# Patient Record
Sex: FEMALE | Race: BLACK OR AFRICAN AMERICAN | Employment: OTHER | URBAN - METROPOLITAN AREA
[De-identification: names, ages, dates, MRNs, and addresses within clinical notes are randomized per-mention and may not be internally consistent; named-entity substitution may affect disease eponyms.]

---

## 2017-05-23 ENCOUNTER — IP HISTORICAL/CONVERTED ENCOUNTER (OUTPATIENT)
Dept: OTHER | Age: 82
End: 2017-05-23

## 2017-11-24 ENCOUNTER — IP HISTORICAL/CONVERTED ENCOUNTER (OUTPATIENT)
Dept: OTHER | Age: 82
End: 2017-11-24

## 2021-09-15 ENCOUNTER — APPOINTMENT (OUTPATIENT)
Dept: CT IMAGING | Age: 86
DRG: 690 | End: 2021-09-15
Attending: EMERGENCY MEDICINE
Payer: MEDICARE

## 2021-09-15 ENCOUNTER — HOSPITAL ENCOUNTER (INPATIENT)
Age: 86
LOS: 2 days | Discharge: HOME | DRG: 690 | End: 2021-09-17
Attending: EMERGENCY MEDICINE | Admitting: INTERNAL MEDICINE
Payer: MEDICARE

## 2021-09-15 ENCOUNTER — APPOINTMENT (OUTPATIENT)
Dept: GENERAL RADIOLOGY | Age: 86
DRG: 690 | End: 2021-09-15
Attending: EMERGENCY MEDICINE
Payer: MEDICARE

## 2021-09-15 DIAGNOSIS — R40.0 SOMNOLENCE: ICD-10-CM

## 2021-09-15 DIAGNOSIS — R55 SYNCOPE AND COLLAPSE: ICD-10-CM

## 2021-09-15 DIAGNOSIS — N30.00 ACUTE CYSTITIS WITHOUT HEMATURIA: Primary | ICD-10-CM

## 2021-09-15 PROBLEM — R41.82 AMS (ALTERED MENTAL STATUS): Status: ACTIVE | Noted: 2021-09-15

## 2021-09-15 LAB
ALBUMIN SERPL-MCNC: 2.5 G/DL (ref 3.5–5)
ALBUMIN/GLOB SERPL: 0.6 {RATIO} (ref 1.1–2.2)
ALP SERPL-CCNC: 105 U/L (ref 45–117)
ALT SERPL-CCNC: 13 U/L (ref 12–78)
ANION GAP SERPL CALC-SCNC: 5 MMOL/L (ref 5–15)
APPEARANCE UR: ABNORMAL
APTT PPP: 26.9 SEC (ref 21.2–34.1)
AST SERPL W P-5'-P-CCNC: 15 U/L (ref 15–37)
ATRIAL RATE: 76 BPM
BACTERIA URNS QL MICRO: ABNORMAL /HPF
BASOPHILS # BLD: 0.1 K/UL (ref 0–0.1)
BASOPHILS NFR BLD: 1 % (ref 0–1)
BILIRUB SERPL-MCNC: 0.5 MG/DL (ref 0.2–1)
BILIRUB UR QL: NEGATIVE
BUN SERPL-MCNC: 6 MG/DL (ref 6–20)
BUN/CREAT SERPL: 9 (ref 12–20)
CA-I BLD-MCNC: 8.6 MG/DL (ref 8.5–10.1)
CALCULATED P AXIS, ECG09: 64 DEGREES
CALCULATED T AXIS, ECG11: 19 DEGREES
CHLORIDE SERPL-SCNC: 109 MMOL/L (ref 97–108)
CK SERPL-CCNC: 24 NG/ML (ref 26–192)
CO2 SERPL-SCNC: 29 MMOL/L (ref 21–32)
COLOR UR: ABNORMAL
CREAT SERPL-MCNC: 0.68 MG/DL (ref 0.55–1.02)
DIAGNOSIS, 93000: NORMAL
DIFFERENTIAL METHOD BLD: ABNORMAL
EOSINOPHIL # BLD: 0.1 K/UL (ref 0–0.4)
EOSINOPHIL NFR BLD: 1 % (ref 0–7)
ERYTHROCYTE [DISTWIDTH] IN BLOOD BY AUTOMATED COUNT: 14.6 % (ref 11.5–14.5)
GLOBULIN SER CALC-MCNC: 4.4 G/DL (ref 2–4)
GLUCOSE BLD STRIP.AUTO-MCNC: 175 MG/DL (ref 65–117)
GLUCOSE SERPL-MCNC: 191 MG/DL (ref 65–100)
GLUCOSE UR STRIP.AUTO-MCNC: 50 MG/DL
HCT VFR BLD AUTO: 40.1 % (ref 35–47)
HGB BLD-MCNC: 12.7 G/DL (ref 11.5–16)
HGB UR QL STRIP: ABNORMAL
HYALINE CASTS URNS QL MICRO: ABNORMAL /LPF (ref 0–5)
IMM GRANULOCYTES # BLD AUTO: 0 K/UL (ref 0–0.04)
IMM GRANULOCYTES NFR BLD AUTO: 0 % (ref 0–0.5)
INR PPP: 1.1 (ref 0.9–1.1)
KETONES UR QL STRIP.AUTO: NEGATIVE MG/DL
LEUKOCYTE ESTERASE UR QL STRIP.AUTO: ABNORMAL
LYMPHOCYTES # BLD: 5.1 K/UL (ref 0.8–3.5)
LYMPHOCYTES NFR BLD: 40 % (ref 12–49)
MAGNESIUM SERPL-MCNC: 2.1 MG/DL (ref 1.6–2.4)
MCH RBC QN AUTO: 27.1 PG (ref 26–34)
MCHC RBC AUTO-ENTMCNC: 31.7 G/DL (ref 30–36.5)
MCV RBC AUTO: 85.5 FL (ref 80–99)
MONOCYTES # BLD: 0.6 K/UL (ref 0–1)
MONOCYTES NFR BLD: 4 % (ref 5–13)
NEUTS SEG # BLD: 7 K/UL (ref 1.8–8)
NEUTS SEG NFR BLD: 54 % (ref 32–75)
NITRITE UR QL STRIP.AUTO: NEGATIVE
NRBC # BLD: 0 K/UL (ref 0–0.01)
NRBC BLD-RTO: 0 PER 100 WBC
P-R INTERVAL, ECG05: 210 MS
PERFORMED BY, TECHID: ABNORMAL
PH UR STRIP: 7 [PH] (ref 5–8)
PLATELET # BLD AUTO: 267 K/UL (ref 150–400)
PMV BLD AUTO: 12.6 FL (ref 8.9–12.9)
POTASSIUM SERPL-SCNC: 2.8 MMOL/L (ref 3.5–5.1)
PROT SERPL-MCNC: 6.9 G/DL (ref 6.4–8.2)
PROT UR STRIP-MCNC: 100 MG/DL
PROTHROMBIN TIME: 13.9 SEC (ref 11.9–14.7)
Q-T INTERVAL, ECG07: 424 MS
QRS DURATION, ECG06: 82 MS
QTC CALCULATION (BEZET), ECG08: 477 MS
RBC # BLD AUTO: 4.69 M/UL (ref 3.8–5.2)
RBC #/AREA URNS HPF: ABNORMAL /HPF (ref 0–5)
SODIUM SERPL-SCNC: 143 MMOL/L (ref 136–145)
SP GR UR REFRACTOMETRY: 1.01 (ref 1–1.03)
THERAPEUTIC RANGE,PTTT: NORMAL SEC
TROPONIN I SERPL-MCNC: <0.05 NG/ML
TSH SERPL DL<=0.05 MIU/L-ACNC: 0.47 UIU/ML (ref 0.36–3.74)
UA: UC IF INDICATED,UAUC: ABNORMAL
UROBILINOGEN UR QL STRIP.AUTO: 2 EU/DL (ref 0.1–1)
VENTRICULAR RATE, ECG03: 76 BPM
WBC # BLD AUTO: 12.9 K/UL (ref 3.6–11)
WBC URNS QL MICRO: >100 /HPF (ref 0–4)

## 2021-09-15 PROCEDURE — 87077 CULTURE AEROBIC IDENTIFY: CPT

## 2021-09-15 PROCEDURE — 74011250636 HC RX REV CODE- 250/636: Performed by: EMERGENCY MEDICINE

## 2021-09-15 PROCEDURE — 87186 SC STD MICRODIL/AGAR DIL: CPT

## 2021-09-15 PROCEDURE — 71045 X-RAY EXAM CHEST 1 VIEW: CPT

## 2021-09-15 PROCEDURE — 80053 COMPREHEN METABOLIC PANEL: CPT

## 2021-09-15 PROCEDURE — 65270000029 HC RM PRIVATE

## 2021-09-15 PROCEDURE — 87086 URINE CULTURE/COLONY COUNT: CPT

## 2021-09-15 PROCEDURE — 93005 ELECTROCARDIOGRAM TRACING: CPT

## 2021-09-15 PROCEDURE — 81001 URINALYSIS AUTO W/SCOPE: CPT

## 2021-09-15 PROCEDURE — 84484 ASSAY OF TROPONIN QUANT: CPT

## 2021-09-15 PROCEDURE — 70450 CT HEAD/BRAIN W/O DYE: CPT

## 2021-09-15 PROCEDURE — 96375 TX/PRO/DX INJ NEW DRUG ADDON: CPT

## 2021-09-15 PROCEDURE — 82550 ASSAY OF CK (CPK): CPT

## 2021-09-15 PROCEDURE — 99285 EMERGENCY DEPT VISIT HI MDM: CPT

## 2021-09-15 PROCEDURE — 85025 COMPLETE CBC W/AUTO DIFF WBC: CPT

## 2021-09-15 PROCEDURE — 74011000250 HC RX REV CODE- 250: Performed by: EMERGENCY MEDICINE

## 2021-09-15 PROCEDURE — 84443 ASSAY THYROID STIM HORMONE: CPT

## 2021-09-15 PROCEDURE — 74011250637 HC RX REV CODE- 250/637: Performed by: INTERNAL MEDICINE

## 2021-09-15 PROCEDURE — 84439 ASSAY OF FREE THYROXINE: CPT

## 2021-09-15 PROCEDURE — 85730 THROMBOPLASTIN TIME PARTIAL: CPT

## 2021-09-15 PROCEDURE — 85610 PROTHROMBIN TIME: CPT

## 2021-09-15 PROCEDURE — 83735 ASSAY OF MAGNESIUM: CPT

## 2021-09-15 PROCEDURE — 96374 THER/PROPH/DIAG INJ IV PUSH: CPT

## 2021-09-15 PROCEDURE — 82962 GLUCOSE BLOOD TEST: CPT

## 2021-09-15 RX ORDER — HYDRALAZINE HYDROCHLORIDE 20 MG/ML
10 INJECTION INTRAMUSCULAR; INTRAVENOUS ONCE
Status: COMPLETED | OUTPATIENT
Start: 2021-09-15 | End: 2021-09-15

## 2021-09-15 RX ORDER — ONDANSETRON 2 MG/ML
4 INJECTION INTRAMUSCULAR; INTRAVENOUS
Status: COMPLETED | OUTPATIENT
Start: 2021-09-15 | End: 2021-09-15

## 2021-09-15 RX ORDER — LOSARTAN POTASSIUM 50 MG/1
50 TABLET ORAL DAILY
COMMUNITY
End: 2022-03-01

## 2021-09-15 RX ORDER — RIVASTIGMINE 4.6 MG/24H
1 PATCH, EXTENDED RELEASE TRANSDERMAL DAILY
Status: DISCONTINUED | OUTPATIENT
Start: 2021-09-16 | End: 2021-09-17 | Stop reason: HOSPADM

## 2021-09-15 RX ORDER — POTASSIUM CHLORIDE 7.45 MG/ML
10 INJECTION INTRAVENOUS
Status: COMPLETED | OUTPATIENT
Start: 2021-09-15 | End: 2021-09-15

## 2021-09-15 RX ORDER — LOSARTAN POTASSIUM 50 MG/1
50 TABLET ORAL DAILY
Status: DISCONTINUED | OUTPATIENT
Start: 2021-09-16 | End: 2021-09-17 | Stop reason: HOSPADM

## 2021-09-15 RX ORDER — SODIUM CHLORIDE AND POTASSIUM CHLORIDE .9; .15 G/100ML; G/100ML
SOLUTION INTRAVENOUS CONTINUOUS
Status: DISCONTINUED | OUTPATIENT
Start: 2021-09-15 | End: 2021-09-17 | Stop reason: HOSPADM

## 2021-09-15 RX ADMIN — POTASSIUM CHLORIDE 10 MEQ: 7.46 INJECTION, SOLUTION INTRAVENOUS at 18:00

## 2021-09-15 RX ADMIN — POTASSIUM CHLORIDE 10 MEQ: 7.46 INJECTION, SOLUTION INTRAVENOUS at 15:30

## 2021-09-15 RX ADMIN — SODIUM CHLORIDE 500 ML: 9 INJECTION, SOLUTION INTRAVENOUS at 12:38

## 2021-09-15 RX ADMIN — APIXABAN 5 MG: 2.5 TABLET, FILM COATED ORAL at 23:48

## 2021-09-15 RX ADMIN — HYDRALAZINE HYDROCHLORIDE 10 MG: 20 INJECTION INTRAMUSCULAR; INTRAVENOUS at 14:18

## 2021-09-15 RX ADMIN — POTASSIUM CHLORIDE 10 MEQ: 7.46 INJECTION, SOLUTION INTRAVENOUS at 17:06

## 2021-09-15 RX ADMIN — CEFTRIAXONE 1 G: 1 INJECTION, POWDER, FOR SOLUTION INTRAMUSCULAR; INTRAVENOUS at 14:05

## 2021-09-15 RX ADMIN — ONDANSETRON 4 MG: 2 INJECTION INTRAMUSCULAR; INTRAVENOUS at 16:00

## 2021-09-15 RX ADMIN — POTASSIUM CHLORIDE AND SODIUM CHLORIDE: 900; 150 INJECTION, SOLUTION INTRAVENOUS at 19:36

## 2021-09-15 RX ADMIN — POTASSIUM CHLORIDE 10 MEQ: 7.46 INJECTION, SOLUTION INTRAVENOUS at 14:05

## 2021-09-15 NOTE — PROGRESS NOTES
Reason for Admission:  AMS                     RUR Score:  7%                   Plan for utilizing home health:   W/C bound/no home health @ this time. PCP: First and Last name:  Priyanka [address] [place] [address]      Name of Practice:    Are you a current patient: Yes/No: Yes   Approximate date of last visit: f tele call in April. Can you participate in a virtual visit with your PCP: No                    Current Advanced Directive/Advance Care Plan: No Order      Healthcare Decision Maker:   Primary HCDM is daughter ( Samantha Sawyer @ [phone]. Transition of Care Plan:   D/C plan is home with daughter ( Samantha Sawyer). Pt will need transport home upon discharge - has stairs.

## 2021-09-15 NOTE — ED PROVIDER NOTES
EMERGENCY DEPARTMENT HISTORY AND PHYSICAL EXAM      Date: 9/15/2021  Patient Name: Vignesh Martin      History of Presenting Illness     Chief Complaint   Patient presents with    Syncope    Altered mental status       History Provided By: Patient and EMS    HPI: Vignesh Martin, 80 y.o. female with a past medical history significant unknown presents to the ED with cc of syncope. Per EMS, patient was on the bedside commode. She had a witnessed syncopal event with her daughter. She did not hit her head. Daughter did report the patient is on Eliquis. She was helped to the ground by her daughter. She has been acting lethargic all morning. No other known infectious symptoms. Patient has no seizure history in the did not report any shaking, bowel or bladder incontinence. She is limited secondary to patient's clinical condition as she is not able to participate in the history. No family at bedside. There are no other complaints, changes, or physical findings at this time. PCP: Amrit, MD Briseida    Current Facility-Administered Medications   Medication Dose Route Frequency Provider Last Rate Last Admin    potassium chloride 10 mEq in 100 ml IVPB  10 mEq IntraVENous Clary KAHN  mL/hr at 09/15/21 1530 10 mEq at 09/15/21 1530    0.9% sodium chloride with KCl 20 mEq/L infusion   IntraVENous CONTINUOUS Ritu Kaye MD        ondansetron [facility]) injection 4 mg  4 mg IntraVENous NOW Carolyn Baird MD         Current Outpatient Medications   Medication Sig Dispense Refill    losartan (COZAAR) 50 mg tablet Take 50 mg by mouth daily.  apixaban (Eliquis) 5 mg tablet Take 5 mg by mouth two (2) times a day.          Past History     Past Medical History:  Unknown    Past Surgical History:  Unknown    Family History:  Unknown    Social History:  Social History     Tobacco Use    Smoking status: Not on file   Substance Use Topics    Alcohol use: Not on file    Drug use: Not on file Allergies:  No Known Allergies      Review of Systems     Review of Systems   Unable to perform ROS: Mental status change       Physical Exam     Physical Exam  Vitals and nursing note reviewed. Constitutional:       General: She is not in acute distress. Appearance: Normal appearance. She is obese. She is not diaphoretic. HENT:      Head: Normocephalic and atraumatic. Nose: Nose normal.      Mouth/Throat:      Mouth: Mucous membranes are moist.   Eyes:      Conjunctiva/sclera: Conjunctivae normal.   Cardiovascular:      Rate and Rhythm: Normal rate. Pulses: Normal pulses. Pulmonary:      Effort: Pulmonary effort is normal. No respiratory distress. Musculoskeletal:         General: No swelling or deformity. Normal range of motion. Skin:     General: Skin is warm and dry. Findings: No rash. Neurological:      General: No focal deficit present. Mental Status: She is lethargic. Cranial Nerves: No facial asymmetry. Comments: Patient arouses to loud voice, or touch. Following some commands. Protecting airway. Tensely moves all 4 extremities. Psychiatric:      Comments: Unable to assess. Lab and Diagnostic Study Results     Labs -     Recent Results (from the past 12 hour(s))   CBC WITH AUTOMATED DIFF    Collection Time: 09/15/21 12:15 PM   Result Value Ref Range    WBC 12.9 (H) 3.6 - 11.0 K/uL    RBC 4.69 3.80 - 5.20 M/uL    HGB 12.7 11.5 - 16.0 g/dL    HCT 40.1 35.0 - 47.0 %    MCV 85.5 80.0 - 99.0 FL    MCH 27.1 26.0 - 34.0 PG    MCHC 31.7 30.0 - 36.5 g/dL    RDW 14.6 (H) 11.5 - 14.5 %    PLATELET 258 232 - 042 K/uL    MPV 12.6 8.9 - 12.9 FL    NRBC 0.0 0.0  WBC    ABSOLUTE NRBC 0.00 0.00 - 0.01 K/uL    NEUTROPHILS 54 32 - 75 %    LYMPHOCYTES 40 12 - 49 %    MONOCYTES 4 (L) 5 - 13 %    EOSINOPHILS 1 0 - 7 %    BASOPHILS 1 0 - 1 %    IMMATURE GRANULOCYTES 0 0 - 0.5 %    ABS. NEUTROPHILS 7.0 1.8 - 8.0 K/UL    ABS.  LYMPHOCYTES 5.1 (H) 0.8 - 3.5 K/UL ABS. MONOCYTES 0.6 0.0 - 1.0 K/UL    ABS. EOSINOPHILS 0.1 0.0 - 0.4 K/UL    ABS. BASOPHILS 0.1 0.0 - 0.1 K/UL    ABS. IMM. GRANS. 0.0 0.00 - 0.04 K/UL    DF AUTOMATED     METABOLIC PANEL, COMPREHENSIVE    Collection Time: 09/15/21 12:15 PM   Result Value Ref Range    Sodium 143 136 - 145 mmol/L    Potassium 2.8 (L) 3.5 - 5.1 mmol/L    Chloride 109 (H) 97 - 108 mmol/L    CO2 29 21 - 32 mmol/L    Anion gap 5 5 - 15 mmol/L    Glucose 191 (H) 65 - 100 mg/dL    BUN 6 6 - 20 mg/dL    Creatinine 0.68 0.55 - 1.02 mg/dL    BUN/Creatinine ratio 9 (L) 12 - 20      GFR est AA >60 >60 ml/min/1.73m2    GFR est non-AA >60 >60 ml/min/1.73m2    Calcium 8.6 8.5 - 10.1 mg/dL    Bilirubin, total 0.5 0.2 - 1.0 mg/dL    AST (SGOT) 15 15 - 37 U/L    ALT (SGPT) 13 12 - 78 U/L    Alk.  phosphatase 105 45 - 117 U/L    Protein, total 6.9 6.4 - 8.2 g/dL    Albumin 2.5 (L) 3.5 - 5.0 g/dL    Globulin 4.4 (H) 2.0 - 4.0 g/dL    A-G Ratio 0.6 (L) 1.1 - 2.2     CK W/ REFLX CKMB    Collection Time: 09/15/21 12:15 PM   Result Value Ref Range    CK 24.0 (L) 26 - 192 ng/mL   TROPONIN I    Collection Time: 09/15/21 12:15 PM   Result Value Ref Range    Troponin-I, Qt. <0.05 <0.05 ng/mL   URINALYSIS W/ REFLEX CULTURE    Collection Time: 09/15/21 12:15 PM    Specimen: Urine   Result Value Ref Range    Color Rupali      Appearance Turbid (A) Clear      Specific gravity 1.015 1.003 - 1.030      pH (UA) 7.0 5.0 - 8.0      Protein 100 (A) Negative mg/dL    Glucose 50 (A) Negative mg/dL    Ketone Negative Negative mg/dL    Bilirubin Negative Negative      Blood Small (A) Negative      Urobilinogen 2.0 (H) 0.1 - 1.0 EU/dL    Nitrites Negative Negative      Leukocyte Esterase Large (A) Negative      WBC >100 (H) 0 - 4 /hpf    RBC 20-50 0 - 5 /hpf    Bacteria 3+ (A) Negative /hpf    UA:UC IF INDICATED Urine Culture Ordered (A) Culture not indicated by UA result      Hyaline cast 5-10 0 - 5 /lpf   GLUCOSE, POC    Collection Time: 09/15/21 12:36 PM   Result Value Ref Range    Glucose (POC) 175 (H) 65 - 117 mg/dL    Performed by Helene Davidson    MAGNESIUM    Collection Time: 09/15/21  2:00 PM   Result Value Ref Range    Magnesium 2.1 1.6 - 2.4 mg/dL       Radiologic Studies -   [unfilled]  CT Results  (Last 48 hours)               09/15/21 1319  CT HEAD WO CONT Final result    Impression:  Periventricular white matter gliosis, evidence for nonacute end   vessel occlusive change. Central cerebral arteriosclerosis. No intracranial hemorrhage. Narrative:  CT head. Axial images are reviewed along with reformatted sagittal/coronal images. Dose reduction: All CT scans at this facility are performed using dose reduction   optimization techniques as appropriate to a performed exam including the   following-   automated exposure control, adjustments of mA and/or Kv according to patient   size, or use of iterative reconstructive technique. Bone windows demonstrate normal aeration imaged sinuses mastoid air cells. Review of intracranial content reveals ventricular and sulcal prominence related   to cerebral atrophy. Periventricular white matter gliosis, evidence to suggest   nonacute end vessel occlusive change. Bilateral basal ganglia calcification. Central cerebral arteriosclerosis. Falx-based calcification. No intracranial   hemorrhage. CXR Results  (Last 48 hours)               09/15/21 1300  XR CHEST PORT Final result    Narrative:  Chest single view. Lungs clear; no interstitial or alveolar pulmonary edema. Cardiac and   mediastinal structures are magnified on this AP view. Thoracic aorta   atherosclerosis. No pneumothorax or sizable pleural effusion. Medical Decision Making and ED Course   - I am the first and primary provider for this patient AND AM THE PRIMARY PROVIDER OF RECORD.     - I reviewed the vital signs, available nursing notes, past medical history, past surgical history, family history and social history. - Initial assessment performed. The patients presenting problems have been discussed, and the staff are in agreement with the care plan formulated and outlined with them. I have encouraged them to ask questions as they arise throughout their visit. Vital Signs-Reviewed the patient's vital signs. Patient Vitals for the past 12 hrs:   Temp Pulse Resp BP SpO2   09/15/21 1407  76 17 (!) 193/93 97 %   09/15/21 1318  72 18 (!) 176/86 100 %   09/15/21 1211 97.9 °F (36.6 °C) 75 19 (!) 153/81 100 %       EKG interpretation: (Preliminary): Performed at 1412, and read at 1420  Rhythm: normal sinus rhythm; and regular . Rate (approx.): 76 Axis: normal; HI interval: prolonged; QRS interval: normal ; ST/T wave: normal; Other findings: left ventricular hypertrophy. Records Reviewed: Nursing Notes        ED Course:       ED Course as of Sep 15 1541   Wed Sep 15, 2021   158 80year-old female unknown past medical history presents via EMS for syncope. Patient was sitting on the commode when she had a syncopal episode. Witnessed by her daughter who helped to the floor. Patient then became responsive only to loud voice and pain just how she is on arrival here. She did not hit her head but is anticoagulated on Eliquis. Differential diagnosis includes orthostatic hypotension versus vasovagal syncope versus seizure versus ICH versus metabolic encephalopathy. Getting labs including CBC, CMP, troponin, UA and CT of her head and chest x-ray. Blood glucose is 175. Patient is protecting her airway at this time. Disposition as per clinical course.    [LW]   2697 wet read reports that patient's CT is unremarkable, formal read is forthcoming. Has UTI which is likely source of her symptoms. Given ceftriaxone. She has hypertension however did not take her blood pressure medications this morning so given IV hydralazine.   Patient admitted to Dr. Salcedo Pritesh.    [LW]      ED Course User Index  [LW] Arian King MD             Consultations:       Consultations: -  Hospitalist Consultant: Dr. Muñiz Leader: We have asked for emergent assistance with regard to this patient. We have discussed the patients HPI, ROS, PE and results this far. They will come and evaluate the patient for admission. Disposition     Disposition: Admitted to Floor Medical Floor the case was discussed with the admitting physician Antonino Sloan. Admitted      Diagnosis     Clinical Impression:   1. Acute cystitis without hematuria    2. Syncope and collapse    3. Somnolence        Attestations:    Kortney Graham MD    Please note that this dictation was completed with [employer], the computer voice recognition software. Quite often unanticipated grammatical, syntax, homophones, and other interpretive errors are inadvertently transcribed by the computer software. Please disregard these errors. Please excuse any errors that have escaped final proofreading. Thank you.

## 2021-09-15 NOTE — ED NOTES
TRANSFER - OUT REPORT:    Verbal report given to LAMONTE Luong on Lonnie Austin  being transferred to [phone]. Report consisted of patients Situation, Background, Assessment and   Recommendations(SBAR). Information from the following report(s) SBAR, ED Summary, [facility] and Recent Results was reviewed with the receiving nurse. Lines:   Peripheral IV 09/15/21 Right;Mid Forearm (Active)   Site Assessment Clean, dry, & intact 09/15/21 1228   Phlebitis Assessment 0 09/15/21 1228   Infiltration Assessment 0 09/15/21 1228   Dressing Status Clean, dry, & intact 09/15/21 1228   Dressing Type Tape;Transparent 09/15/21 1228   Hub Color/Line Status Pink;Flushed;Patent 09/15/21 1228       Peripheral IV 09/15/21 Left; Lower Forearm (Active)   Site Assessment Clean, dry, & intact 09/15/21 1229   Phlebitis Assessment 0 09/15/21 1229   Infiltration Assessment 0 09/15/21 1229   Dressing Status Clean, dry, & intact 09/15/21 1229   Dressing Type Tape;Transparent 09/15/21 1229   Hub Color/Line Status Pink;Flushed;Patent 09/15/21 1229        Opportunity for questions and clarification was provided.

## 2021-09-15 NOTE — ED TRIAGE NOTES
Patient had a witnessed syncopal episode at home while sitting on the [facility] per EMS. Patient was not using restroom at the time. Patient, since syncopal episode, has been altered and aroused to painful stimuli.

## 2021-09-15 NOTE — PROGRESS NOTES
9/15/21. PCP is Dr. Rusty Zuñiga. D/C Plan is home with daughter ( Blanca Husbands @ [phone]. Pt will need transportation home - has stairs. Pt is w/c bound.

## 2021-09-15 NOTE — Clinical Note
Status[de-identified] INPATIENT [101]   Type of Bed: Telemetry [19]   Cardiac Monitoring Required?: Yes   Inpatient Hospitalization Certified Necessary for the Following Reasons: 3.  Patient receiving treatment that can only be provided in an inpatient setting (further clarification in H&P documentation)   Admitting Diagnosis: AMS (altered mental status) [2957203]   Admitting Physician: Larissa Cruz [[id number]]   Attending Physician: Reche Bound [[id number]]   Estimated Length of Stay: 2 Midnights   Discharge Plan[de-identified] Home with Office Follow-up

## 2021-09-15 NOTE — ED NOTES
12:42 PM    Patient's clothing placed in a patient belonging bag. Patient's social security card placed in a white envelope with \"social security card\" written on the outside and placed in belonging bag with clothing. 2:15 PM    Dr. Freddrick Cooks notified of blood pressure with orders received.

## 2021-09-15 NOTE — ED NOTES
Care assumed and bedside SBAR report endorsed on Sameer Armenta. Pt cardiac monitoring continued , spO2 Monitoring continued, IV reassed, call bell within reach, side rails up x2, resting comfortable but easily arousable, no signs of acute distress. , bed in lowest position, MAR reviewed, Labs reviewed, will continue to monitor

## 2021-09-16 PROBLEM — E44.1 MILD PROTEIN-CALORIE MALNUTRITION (HCC): Status: ACTIVE | Noted: 2021-09-16

## 2021-09-16 LAB
ANION GAP SERPL CALC-SCNC: 6 MMOL/L (ref 5–15)
ATRIAL RATE: 156 BPM
BUN SERPL-MCNC: 5 MG/DL (ref 6–20)
BUN/CREAT SERPL: 10 (ref 12–20)
CA-I BLD-MCNC: 8.6 MG/DL (ref 8.5–10.1)
CALCULATED R AXIS, ECG10: 31 DEGREES
CALCULATED T AXIS, ECG11: -82 DEGREES
CHLORIDE SERPL-SCNC: 108 MMOL/L (ref 97–108)
CO2 SERPL-SCNC: 29 MMOL/L (ref 21–32)
CREAT SERPL-MCNC: 0.5 MG/DL (ref 0.55–1.02)
DIAGNOSIS, 93000: NORMAL
GLUCOSE SERPL-MCNC: 111 MG/DL (ref 65–100)
POTASSIUM SERPL-SCNC: 3.9 MMOL/L (ref 3.5–5.1)
Q-T INTERVAL, ECG07: 272 MS
QRS DURATION, ECG06: 74 MS
QTC CALCULATION (BEZET), ECG08: 401 MS
SODIUM SERPL-SCNC: 143 MMOL/L (ref 136–145)
T4 FREE SERPL-MCNC: 1.3 NG/DL (ref 0.8–1.5)
VENTRICULAR RATE, ECG03: 131 BPM

## 2021-09-16 PROCEDURE — 97161 PT EVAL LOW COMPLEX 20 MIN: CPT

## 2021-09-16 PROCEDURE — 97530 THERAPEUTIC ACTIVITIES: CPT

## 2021-09-16 PROCEDURE — 74011250637 HC RX REV CODE- 250/637: Performed by: INTERNAL MEDICINE

## 2021-09-16 PROCEDURE — 93005 ELECTROCARDIOGRAM TRACING: CPT

## 2021-09-16 PROCEDURE — 74011000250 HC RX REV CODE- 250: Performed by: INTERNAL MEDICINE

## 2021-09-16 PROCEDURE — 80048 BASIC METABOLIC PNL TOTAL CA: CPT

## 2021-09-16 PROCEDURE — 36415 COLL VENOUS BLD VENIPUNCTURE: CPT

## 2021-09-16 PROCEDURE — 74011250636 HC RX REV CODE- 250/636: Performed by: INTERNAL MEDICINE

## 2021-09-16 PROCEDURE — 65270000029 HC RM PRIVATE

## 2021-09-16 PROCEDURE — 74011250636 HC RX REV CODE- 250/636: Performed by: EMERGENCY MEDICINE

## 2021-09-16 RX ORDER — DILTIAZEM HYDROCHLORIDE 120 MG/1
180 CAPSULE, COATED, EXTENDED RELEASE ORAL DAILY
Status: DISCONTINUED | OUTPATIENT
Start: 2021-09-16 | End: 2021-09-17 | Stop reason: HOSPADM

## 2021-09-16 RX ORDER — POTASSIUM CHLORIDE 1.5 G/1.77G
20 POWDER, FOR SOLUTION ORAL 2 TIMES DAILY WITH MEALS
Status: COMPLETED | OUTPATIENT
Start: 2021-09-16 | End: 2021-09-17

## 2021-09-16 RX ORDER — DILTIAZEM HYDROCHLORIDE 30 MG/1
30 TABLET, FILM COATED ORAL 3 TIMES DAILY
Status: DISCONTINUED | OUTPATIENT
Start: 2021-09-16 | End: 2021-09-16

## 2021-09-16 RX ADMIN — DILTIAZEM HYDROCHLORIDE 30 MG: 30 TABLET, FILM COATED ORAL at 09:52

## 2021-09-16 RX ADMIN — POTASSIUM CHLORIDE 20 MEQ: 1.5 FOR SOLUTION ORAL at 16:28

## 2021-09-16 RX ADMIN — POTASSIUM CHLORIDE 20 MEQ: 1.5 FOR SOLUTION ORAL at 08:00

## 2021-09-16 RX ADMIN — APIXABAN 5 MG: 2.5 TABLET, FILM COATED ORAL at 22:22

## 2021-09-16 RX ADMIN — POTASSIUM CHLORIDE 20 MEQ: 1.5 FOR SOLUTION ORAL at 01:43

## 2021-09-16 RX ADMIN — CEFTRIAXONE 1 G: 1 INJECTION, POWDER, FOR SOLUTION INTRAMUSCULAR; INTRAVENOUS at 14:07

## 2021-09-16 RX ADMIN — DILTIAZEM HYDROCHLORIDE 30 MG: 30 TABLET, FILM COATED ORAL at 01:42

## 2021-09-16 RX ADMIN — DILTIAZEM HYDROCHLORIDE 240 MG: 120 CAPSULE, COATED, EXTENDED RELEASE ORAL at 16:28

## 2021-09-16 RX ADMIN — CEFTRIAXONE 1 G: 1 INJECTION, POWDER, FOR SOLUTION INTRAMUSCULAR; INTRAVENOUS at 22:22

## 2021-09-16 RX ADMIN — APIXABAN 5 MG: 2.5 TABLET, FILM COATED ORAL at 09:52

## 2021-09-16 RX ADMIN — POTASSIUM CHLORIDE AND SODIUM CHLORIDE: 900; 150 INJECTION, SOLUTION INTRAVENOUS at 22:29

## 2021-09-16 RX ADMIN — LOSARTAN POTASSIUM 50 MG: 50 TABLET, FILM COATED ORAL at 09:56

## 2021-09-16 NOTE — PROGRESS NOTES
CM reviewed chart. Patient is currently being treated with IV antibiotics and IV fluids. Patient is wheelchair bound at home and plan is to return home with daughter. CM will continue to follow for any possible discharge needs.

## 2021-09-16 NOTE — PROGRESS NOTES
Admission skin assessment done with Ann Lambert RN. Some moisture noted underneath breasts and abdominal folds, otherwise skin is intact.

## 2021-09-16 NOTE — PROGRESS NOTES
PROGRESS NOTE      Subjective: Poor appetite. According to daughter who is at bedside patient is reasonably back to baseline. She still talks gibberish. Barely follows command.   Episode of A. fib with RVR yesterday     Visit Vitals  BP (!) 149/76   Pulse (!) 114   Temp 98.7 °F (37.1 °C)   Resp 16   Ht 5' 6\" (1.676 m)   Wt 89.3 kg (196 lb 13.9 oz)   SpO2 99%   BMI 31.78 kg/m²       Current Facility-Administered Medications:     potassium chloride (KLOR-CON) packet for solution 20 mEq, 20 mEq, Oral, BID WITH MEALS, Abdiaziz Villegas MD, 20 mEq at 09/16/21 0800    dilTIAZem IR (CARDIZEM) tablet 30 mg, 30 mg, Oral, TID, Koki SEE MD, 30 mg at 09/16/21 8136    0.9% sodium chloride with KCl 20 mEq/L infusion, , IntraVENous, CONTINUOUS, Kev Obrien MD, Last Rate: 75 mL/hr at 09/15/21 1936, New Bag at 09/15/21 1936    apixaban (ELIQUIS) tablet 5 mg, 5 mg, Oral, BID, Koki SEE MD, 5 mg at 09/16/21 0952    losartan (COZAAR) tablet 50 mg, 50 mg, Oral, DAILY, Abdiaziz Villegas MD, 50 mg at 09/16/21 0956    cefTRIAXone (ROCEPHIN) 1 g in sterile water (preservative free) 10 mL IV syringe, 1 g, IntraVENous, Q24H, Abdiaziz Morales MD    rivastigmine (EXELON) 4.6 mg/24 hour patch 1 Patch, 1 Patch, TransDERmal, DAILY, Koki SEE MD, 1 Patch at 09/16/21 1254  Recent Results (from the past 24 hour(s))   MAGNESIUM    Collection Time: 09/15/21  2:00 PM   Result Value Ref Range    Magnesium 2.1 1.6 - 2.4 mg/dL   PROTHROMBIN TIME + INR    Collection Time: 09/15/21  9:25 PM   Result Value Ref Range    Prothrombin time 13.9 11.9 - 14.7 sec    INR 1.1 0.9 - 1.1     PTT    Collection Time: 09/15/21  9:25 PM   Result Value Ref Range    aPTT 26.9 21.2 - 34.1 sec    aPTT, therapeutic range   sec   TSH 3RD GENERATION    Collection Time: 09/15/21 10:00 PM   Result Value Ref Range    TSH 0.47 0.36 - 3.74 uIU/mL   T4, FREE    Collection Time: 09/15/21 10:00 PM   Result Value Ref Range    T4, Free 1.3 0.8 - 1.5 ng/dL   EKG, 12 LEAD, INITIAL    Collection Time: 09/16/21  1:21 AM   Result Value Ref Range    Ventricular Rate 131 BPM    Atrial Rate 156 BPM    QRS Duration 74 ms    Q-T Interval 272 ms    QTC Calculation (Bezet) 401 ms    Calculated R Axis 31 degrees    Calculated T Axis -82 degrees    Diagnosis       Atrial fibrillation with rapid ventricular response with premature   ventricular or aberrantly conducted complexes  Nonspecific ST and T wave abnormality  Abnormal ECG  No previous ECGs available  Confirmed by Magdiel Grande ([id number]) on 9/16/2021 72:45:04 PM     METABOLIC PANEL, BASIC    Collection Time: 09/16/21  8:05 AM   Result Value Ref Range    Sodium 143 136 - 145 mmol/L    Potassium 3.9 3.5 - 5.1 mmol/L    Chloride 108 97 - 108 mmol/L    CO2 29 21 - 32 mmol/L    Anion gap 6 5 - 15 mmol/L    Glucose 111 (H) 65 - 100 mg/dL    BUN 5 (L) 6 - 20 mg/dL    Creatinine 0.50 (L) 0.55 - 1.02 mg/dL    BUN/Creatinine ratio 10 (L) 12 - 20      GFR est AA >60 >60 ml/min/1.73m2    GFR est non-AA >60 >60 ml/min/1.73m2    Calcium 8.6 8.5 - 10.1 mg/dL     CT HEAD WO CONT   Final Result   Periventricular white matter gliosis, evidence for nonacute end   vessel occlusive change. Central cerebral arteriosclerosis. No intracranial hemorrhage. XR CHEST PORT   Final Result                HEENT: Normocephalic atraumatic. Neck: No distended neck vein  Lungs: Clear anteriorly  Heart: Irregularly irregular obese positive bowel sounds  Abdomen:   Lower Extremities: No cyanosis or edema  CNS: Awake alert talks Elizabeth Men will he follows command. Psych:      Assessment: UTI  A. fib with RVR  Dementia   hypertension  Hypokalemia  Weakness          Plan: Discharge planning. Continue nutritional support. Physical therapy. Discussed at length with daughter. Will attempt to control the heart rate better.

## 2021-09-16 NOTE — CONSULTS
Comprehensive Nutrition Assessment    Type and Reason for Visit: Consult (Poor PO)    Nutrition Recommendations/Plan:   Continue Easy to Chew diet    Rec'd SLP eval    Add ensure HP TID  Add beneprotein BID    Monitor BG levels, may want to consider obtaining A1C and starting insulin    Document % intakes and BM in I/O's    Nutrition Assessment:  Admitted for weakness and worsening baseline mental status. Episode of A. fib with RVR on 9/15. RD consulted for poor PO, talked with pt daughter bedside. Pt consumed 0% of B, but had <25% of L consisting of mashed potatoes, ice cream, and applesauce. Agreeable to ensure HP, and beneprotein as MD wants pt to consume more protein. RD to monitor ONS acceptance. Labs: Glu , BUN 5, Cr 0.50. Meds: rocephin, KCl. Malnutrition Assessment:  Malnutrition Status:  Mild malnutrition    Context:  Acute illness     Findings of the 6 clinical characteristics of malnutrition:   Energy Intake:  7 - 50% or less of est energy requirements for 5 or more days  Weight Loss:  No significant weight loss     Body Fat Loss:  No significant body fat loss,     Muscle Mass Loss:  No significant muscle mass loss,    Fluid Accumulation:  No significant fluid accumulation,      Nutrition Related Findings:  NFPE without acute findings. Had some c/s issues yesterday, but family has now brought in dentures, SLP dalila likely needed. No BM since admit. No N/V/D/C. No edema. Wounds:    None       Current Nutrition Therapies:  ADULT DIET Easy to Chew  ADULT ORAL NUTRITION SUPPLEMENT Breakfast, Lunch, Dinner; Low Calorie/High Protein    Anthropometric Measures:  · Height:  5' 6\" (167.6 cm)  · Current Body Wt:  89.3 kg (196 lb 13.9 oz)     · Ideal Body Wt:  130 lbs:  151.4 %   · BMI Category:  Obese class 1 (BMI 30.0-34. 9)       Nutrition Diagnosis:   · Inadequate oral intake related to cognitive or neurological impairment (AMS) as evidenced by intake 26-50%    Nutrition Interventions:   Food and/or Nutrient Delivery: Continue current diet, Start oral nutrition supplement  Nutrition Education and Counseling: Education not indicated  Coordination of Nutrition Care: Continue to monitor while inpatient, Speech therapy    Goals:  Pt to meet >75% of EEN within 5 days. BG <180. Nutrition Monitoring and Evaluation:   Behavioral-Environmental Outcomes: None identified  Food/Nutrient Intake Outcomes: Supplement intake, Food and nutrient intake  Physical Signs/Symptoms Outcomes: Chewing or swallowing, Meal time behavior, Biochemical data, Weight    Discharge Planning:     Too soon to determine     Electronically signed by Shelbie Akhtar RD on 9/16/2021 at 3:55 PM    Contact: [phone]

## 2021-09-16 NOTE — CONSULTS
Nutrition Note    Consult received for poor appetite. RD to add supplements and f/u for complete assessment. Consider SLP dalila d/t noted confusion, difficulty following commands.      Electronically signed by Nelson Angulo on 9/16/2021 at 2:19 PM    Contact: [phone] or via [employer]

## 2021-09-16 NOTE — PROGRESS NOTES
Notified by telemetry patient in a-fib with HR in the 130's. Spoke with daughter, however she is unsure if patient has history of a-fib. Dr. Anjel Post called to make aware and order for EKG given. Dr. Anjel Post notified of EKG results and he stated that he will put in some orders for patient.

## 2021-09-16 NOTE — PROGRESS NOTES
PHYSICAL THERAPY EVALUATION  Patient: Antoine Vega (89 y.o. female)  Date: 9/16/2021  Primary Diagnosis: AMS (altered mental status) [R41.82]        Precautions: falls    ASSESSMENT  This is a 81 y/o female who  came to  [facility]  with c/o  weakness and worsening baseline mental status , admitted on  9/15/21 for AMS  . Pt has PMH of syncope ,DVT,Low vision/macular degeneration,Degenerative disc disease,Advanced dementia ,Hypertension ,Obesity , back surgery and cataract repair . Pt received  in semi-supine , daughter present in the room . Pt is alert & disoriented x 4  ,pt  unable to provide any info about PLOF. Per daughter , pt lives with daughter and niece in a 2 story house with  4 steps to enter , HR on bilateral sides , 7 steps to go to the second floor , HR on L side going up  . Daughter reports , pt's bedroom is upstairs , but once pt is up on the second floor , she doesn't come down and stay on 2nd floor ,was dependent with ADL's and  for functional transfers/mobility , w/c bound prior to admission . Based on the objective data described below, the patient presents with inability to follow commands at this time . Per daughter  ,  pt follow commands sometimes. Pt requires total assist for rolling from side to  side and is  resistive  . Pt appears to be at her functional baseline and is not appropriate for physical therapy sec to inability to follow commands . Daughter provided education  about care of advanced dementia pt . No acute PT needs at this time , will d/c from skilled PT services . Recommend d/c to home with 24 x 7 SUP and prior level of care  when medically appropriate . Current Level of Function Impacting Discharge (mobility/balance): Pt is dependent for transfers/mobility    Functional Outcome Measure: The patient scored 6 on the AM PAC basic mobility outcome measure which is indicative of high complexity.       Other factors to consider for discharge: PLOF, family support        PLAN :  Recommendation for discharge: (in order for the patient to meet his/her long term goals)  Home with 24 x7 sup and prior level of care     This discharge recommendation:  Has been made in collaboration with the attending provider and/or case management    IF patient discharges home will need the following DME: hospital bed         SUBJECTIVE:   Patient stated nothing when asked few questions  OBJECTIVE DATA SUMMARY:   HISTORY:    No past medical history on file. No past surgical history on file. Personal factors and/or comorbidities impacting plan of care:     Home Situation  Home Environment: Private residence  # Steps to Enter: 2  Rails to Enter: Yes  Hand Rails : Bilateral  Wheelchair Ramp: No  One/Two Story Residence: Two story  # of Interior Steps: 7  Interior Rails: Left  Living Alone: No  Support Systems: Child(blank) (lives with daughter)  Current DME Used/Available at Home: Commode, bedside, Grab bars, Shower chair, Walker, rolling, Wheelchair    PLOF: Pt is dependent for ADLS/IADLS, dependent with transfers , w/c bound prior to admission. EXAMINATION/PRESENTATION/DECISION MAKING:   Critical Behavior:  Neurologic State: Alert, Confused  Orientation Level: Disoriented X4  Cognition: Decreased attention/concentration, Decreased command following, Memory loss, Impaired decision making  Safety/Judgement: Decreased insight into deficits, Decreased awareness of need for safety, Decreased awareness of need for assistance, Decreased awareness of environment  Hearing:     Skin:  intact where exposed    Range Of Motion:  AROM: Generally decreased, functional    Strength:    Strength:  (unable to perform MMT sec o pt with impaired cognition )  joelle:      Functional Mobility:  Bed Mobility:  Rolling: Total assistance    Functional Measure:    [address] [facility] [address] [address] AM-PAC 6 Clicks         Basic Mobility Inpatient Short Form  How much difficulty does the patient currently have. .. Unable A Lot A Little None   1. Turning over in bed (including adjusting bedclothes, sheets and blankets)? [x] 1   [] 2   [] 3   [] 4   2. Sitting down on and standing up from a chair with arms ( e.g., wheelchair, bedside commode, etc.)   [x] 1   [] 2   [] 3   [] 4   3. Moving from lying on back to sitting on the side of the bed? [x] 1   [] 2   [] 3   [] 4          How much help from another person does the patient currently need. .. Total A Lot A Little None   4. Moving to and from a bed to a chair (including a wheelchair)? [x] 1   [] 2   [] 3   [] 4   5. Need to walk in hospital room? [x] 1   [] 2   [] 3   [] 4   6. Climbing 3-5 steps with a railing? [x] 1   [] 2   [] 3   [] 4   © , Trustees of [address] [place] [address], under license to [employer]. All rights reserved     Score:  Initial: 6 Most Recent: X (Date: 21-- )   Interpretation of Tool:  Represents activities that are increasingly more difficult (i.e. Bed mobility, Transfers, Gait). Score 24 23 22-20 19-15 14-10 9-7 6   Modifier CH CI CJ CK CL CM CN          Physical Therapy Evaluation Charge Determination   History Examination Presentation Decision-Making   MEDIUM  Complexity : 1-2 comorbidities / personal factors will impact the outcome/ POC  LOW Complexity : 1-2 Standardized tests and measures addressing body structure, function, activity limitation and / or participation in recreation  LOW Complexity : Stable, uncomplicated  Other Functional Measure [facility] 6 high complexity      Based on the above components, the patient evaluation is determined to be of the following complexity level: LOW     Pain Ratin/10    Activity Tolerance:   Fair  Please refer to the flowsheet for vital signs taken during this treatment.     After treatment patient left in no apparent distress:   Supine in bed, Call bell within reach, Bed / chair alarm activated, Caregiver / family present, and Side rails x 3    COMMUNICATION/EDUCATION:   The patients plan of care was discussed with: Registered nurse. Fall prevention education was provided and the patient/caregiver indicated understanding.     Thank you for this referral.  Ian Richardson   Time Calculation: 30 mins

## 2021-09-16 NOTE — H&P
History and Physical (Inpatient)    Patient:    Adriano Mason   80 y.o. Chief Complaint:   Chief Complaint   Patient presents with    Syncope    Altered mental status         History of Present Illness: This is an 30-year-old black female with history of DVT, dementia apparently syncope in the past arthritis who presents with weakness and worsening baseline mental status. All history obtainable from the daughter Ms. Ginny Petit. She tells me while patient was on the commode she kind of slumped off and he helped her to the ground. There was no tonic-clonic activity she did not hit her head. The brought to the hospital to have her evaluated. Because she is on Eliquis patient has had a CAT scan of the brain. At baseline daughter says she is pretty advanced in her mental status. ROS: Unobtainable from the patient      History:  History of syncope  History of DVT  Low vision/macular degeneration  Degenerative disc disease  Advanced dementia  Hypertension  Obesity    Past surgical history  Status post hysterectomy  Status post back surgery  Status post cataract repair    Social history  She is  with 5 children  She does not drink or smoke   She walks with a walker      No family history on file.     Allergies:  No Known Allergies    Current Medications:    Current Facility-Administered Medications:     0.9% sodium chloride with KCl 20 mEq/L infusion, , IntraVENous, CONTINUOUS, Shannon Mazariegos MD, Last Rate: 75 mL/hr at 09/15/21 1936, New Bag at 09/15/21 1936    apixaban (ELIQUIS) tablet 5 mg, 5 mg, Oral, BID, Jessi Dugan MD    [START ON 9/16/2021] losartan (COZAAR) tablet 50 mg, 50 mg, Oral, DAILY, [place]betts Shows, Abdiaziz SEE MD    cefTRIAXone (ROCEPHIN) 1 g in sterile water (preservative free) 10 mL IV syringe, 1 g, IntraVENous, Q24H, Jessi Dugan MD       Physical Exam:  Visit Vitals  BP (!) 142/91   Pulse (!) 128   Temp 97.9 °F (36.6 °C)   Resp 11   Ht 5' 6\" (1.676 m)   Wt 90.7 kg (200 lb)   SpO2 100%   BMI 32.28 kg/m²     Elderly black female obese comfortably no respiratory distress    HEENT normocephalic atraumatic oral mucosa no teeth dry mucous membrane  Neck without any distended neck vein  Lungs are clear bilaterally no wheeze or crackles  Heart S1-S2 regular  Abdomen soft obese nontender nondistended positive bowel sounds  Lower extremities without any cyanosis or edema  CNS alert , does not follow command easily irritable  Psych not so cooperative    Impression this is an 44-year-old black female with advanced dementia, history of DVT, degenerative disc disease arthritis who presents with weakness found to have hypokalemia and UTI admitted for further care.     Findings/Diagnosis: Urinary tract infection  Possible transient loss of consciousness  History of DVT  Hypertension  Hypokalemia  Weakness probably secondary to hypokalemia  Advanced dementia    Laboratory:      Recent Results (from the past 24 hour(s))   EKG, 12 LEAD, INITIAL    Collection Time: 09/15/21 12:09 PM   Result Value Ref Range    Ventricular Rate 76 BPM    Atrial Rate 76 BPM    P-R Interval 210 ms    QRS Duration 82 ms    Q-T Interval 424 ms    QTC Calculation (Bezet) 477 ms    Calculated P Axis 64 degrees    Calculated T Axis 19 degrees    Diagnosis       Sinus rhythm with 1st degree A-V block with occasional Premature ventricular   complexes  Minimal voltage criteria for LVH, may be normal variant  Borderline ECG  No previous ECGs available  Confirmed by Polly Chavarria ([id number]) on 9/15/2021 4:12:33 PM     CBC WITH AUTOMATED DIFF    Collection Time: 09/15/21 12:15 PM   Result Value Ref Range    WBC 12.9 (H) 3.6 - 11.0 K/uL    RBC 4.69 3.80 - 5.20 M/uL    HGB 12.7 11.5 - 16.0 g/dL    HCT 40.1 35.0 - 47.0 %    MCV 85.5 80.0 - 99.0 FL    MCH 27.1 26.0 - 34.0 PG    MCHC 31.7 30.0 - 36.5 g/dL    RDW 14.6 (H) 11.5 - 14.5 %    PLATELET 558 776 - 725 K/uL    MPV 12.6 8.9 - 12.9 FL    NRBC 0.0 0.0  WBC    ABSOLUTE NRBC 0.00 0.00 - 0.01 K/uL    NEUTROPHILS 54 32 - 75 %    LYMPHOCYTES 40 12 - 49 %    MONOCYTES 4 (L) 5 - 13 %    EOSINOPHILS 1 0 - 7 %    BASOPHILS 1 0 - 1 %    IMMATURE GRANULOCYTES 0 0 - 0.5 %    ABS. NEUTROPHILS 7.0 1.8 - 8.0 K/UL    ABS. LYMPHOCYTES 5.1 (H) 0.8 - 3.5 K/UL    ABS. MONOCYTES 0.6 0.0 - 1.0 K/UL    ABS. EOSINOPHILS 0.1 0.0 - 0.4 K/UL    ABS. BASOPHILS 0.1 0.0 - 0.1 K/UL    ABS. IMM. GRANS. 0.0 0.00 - 0.04 K/UL    DF AUTOMATED     METABOLIC PANEL, COMPREHENSIVE    Collection Time: 09/15/21 12:15 PM   Result Value Ref Range    Sodium 143 136 - 145 mmol/L    Potassium 2.8 (L) 3.5 - 5.1 mmol/L    Chloride 109 (H) 97 - 108 mmol/L    CO2 29 21 - 32 mmol/L    Anion gap 5 5 - 15 mmol/L    Glucose 191 (H) 65 - 100 mg/dL    BUN 6 6 - 20 mg/dL    Creatinine 0.68 0.55 - 1.02 mg/dL    BUN/Creatinine ratio 9 (L) 12 - 20      GFR est AA >60 >60 ml/min/1.73m2    GFR est non-AA >60 >60 ml/min/1.73m2    Calcium 8.6 8.5 - 10.1 mg/dL    Bilirubin, total 0.5 0.2 - 1.0 mg/dL    AST (SGOT) 15 15 - 37 U/L    ALT (SGPT) 13 12 - 78 U/L    Alk.  phosphatase 105 45 - 117 U/L    Protein, total 6.9 6.4 - 8.2 g/dL    Albumin 2.5 (L) 3.5 - 5.0 g/dL    Globulin 4.4 (H) 2.0 - 4.0 g/dL    A-G Ratio 0.6 (L) 1.1 - 2.2     CK W/ REFLX CKMB    Collection Time: 09/15/21 12:15 PM   Result Value Ref Range    CK 24.0 (L) 26 - 192 ng/mL   TROPONIN I    Collection Time: 09/15/21 12:15 PM   Result Value Ref Range    Troponin-I, Qt. <0.05 <0.05 ng/mL   URINALYSIS W/ REFLEX CULTURE    Collection Time: 09/15/21 12:15 PM    Specimen: Urine   Result Value Ref Range    Color Rupali      Appearance Turbid (A) Clear      Specific gravity 1.015 1.003 - 1.030      pH (UA) 7.0 5.0 - 8.0      Protein 100 (A) Negative mg/dL    Glucose 50 (A) Negative mg/dL    Ketone Negative Negative mg/dL    Bilirubin Negative Negative      Blood Small (A) Negative      Urobilinogen 2.0 (H) 0.1 - 1.0 EU/dL    Nitrites Negative Negative      Leukocyte Esterase Large (A) Negative      WBC >100 (H) 0 - 4 /hpf    RBC 20-50 0 - 5 /hpf    Bacteria 3+ (A) Negative /hpf    UA:UC IF INDICATED Urine Culture Ordered (A) Culture not indicated by UA result      Hyaline cast 5-10 0 - 5 /lpf   GLUCOSE, POC    Collection Time: 09/15/21 12:36 PM   Result Value Ref Range    Glucose (POC) 175 (H) 65 - 117 mg/dL    Performed by Ahmet Moran    MAGNESIUM    Collection Time: 09/15/21  2:00 PM   Result Value Ref Range    Magnesium 2.1 1.6 - 2.4 mg/dL       CT HEAD WO CONT   Final Result   Periventricular white matter gliosis, evidence for nonacute end   vessel occlusive change. Central cerebral arteriosclerosis. No intracranial hemorrhage. XR CHEST PORT   Final Result          Plan of Care/Planned Procedure: Admit to the hospital.  IV fluid IV antibiotics. Correct hypokalemia. Fall precautions. Physical therapy. Treat UTI. Discussed with daughter. DVT prophylaxis.

## 2021-09-17 VITALS
HEIGHT: 66 IN | RESPIRATION RATE: 20 BRPM | HEART RATE: 92 BPM | BODY MASS INDEX: 31.64 KG/M2 | OXYGEN SATURATION: 98 % | TEMPERATURE: 98.3 F | SYSTOLIC BLOOD PRESSURE: 117 MMHG | WEIGHT: 196.87 LBS | DIASTOLIC BLOOD PRESSURE: 76 MMHG

## 2021-09-17 PROCEDURE — 74011250637 HC RX REV CODE- 250/637: Performed by: INTERNAL MEDICINE

## 2021-09-17 RX ORDER — SULFAMETHOXAZOLE AND TRIMETHOPRIM 800; 160 MG/1; MG/1
1 TABLET ORAL 2 TIMES DAILY
Qty: 14 TABLET | Refills: 0 | Status: SHIPPED | OUTPATIENT
Start: 2021-09-17 | End: 2021-09-24

## 2021-09-17 RX ORDER — RIVASTIGMINE 4.6 MG/24H
1 PATCH, EXTENDED RELEASE TRANSDERMAL DAILY
Qty: 30 PATCH | Refills: 0 | Status: SHIPPED | OUTPATIENT
Start: 2021-09-18 | End: 2021-10-18

## 2021-09-17 RX ORDER — DILTIAZEM HYDROCHLORIDE 180 MG/1
180 CAPSULE, COATED, EXTENDED RELEASE ORAL DAILY
Qty: 30 CAPSULE | Refills: 0 | Status: SHIPPED | OUTPATIENT
Start: 2021-09-18 | End: 2021-10-18

## 2021-09-17 RX ADMIN — POTASSIUM CHLORIDE 20 MEQ: 1.5 FOR SOLUTION ORAL at 09:53

## 2021-09-17 RX ADMIN — DILTIAZEM HYDROCHLORIDE 240 MG: 120 CAPSULE, COATED, EXTENDED RELEASE ORAL at 09:48

## 2021-09-17 RX ADMIN — APIXABAN 5 MG: 2.5 TABLET, FILM COATED ORAL at 09:48

## 2021-09-17 RX ADMIN — LOSARTAN POTASSIUM 50 MG: 50 TABLET, FILM COATED ORAL at 09:47

## 2021-09-17 NOTE — DISCHARGE SUMMARY
Discharge Summary     Florinda Da Silva Date:   9/15/2021 12:05 PM  Discharge Date:   9/17/2021  Discharge Condition:   Improved, stable  Discharge Diagnosis  Problem List Items Addressed This Visit          Other    AMS (altered mental status)          Other Visit Diagnoses       Acute cystitis without hematuria    -  Primary    Relevant Medications    losartan (COZAAR) 50 mg tablet    Syncope and collapse            Altered mental status  Dementia  UTI gram-negative paddy  A. fib with rapid ventricular response  Hypertension     Hospital Stay  Narrative of Hospital Course: See H&P for details  Briefly this is an elderly black female with history of advanced dementia who presents with transient loss of consciousness altered mental status admitted for further care. Patient was admitted to the hospital.  She was started on IV antibiotics for UTI. She had some IV fluids. I discussed with daughter at length. She did have tachycardia A. fib on monitor was started on Cardizem. This controlled her rate. She improved but still confused but according to daughter back to her baseline. Vitals stable  On examination she is elderly obese black female comfortable in no distress  HEENT normocephalic atraumatic she generally tries not to open the eyes  Neck without adenopathy  Lungs clear no wheeze  Heart irregularly irregular  Abdomen obese positive bowel sounds  Lower extremities no edema  CNS alert and oriented to person she talks gibberish she does not follow command. Impression. Stable for discharge  Consultants:  None     Surgeries/procedures Performed:    IV fluids IV antibiotics       Discharge Plan:    Home or Self Care     Hospital/Incidental Findings Requiring Follow Up:     Patient Instructions:  Fall precautions. Diet: DIET ADULT  DIET ADULT ORAL NUTRITION SUPPLEMENT  DIET ADULT ORAL NUTRITION SUPPLEMENT     Activity: Mostly bedbound  For number of days (if applicable):        Other Instructions: Provider Follow-Up:   Follow-up with PCP  Follow-up Appointments   Procedures    FOLLOW UP VISIT Appointment in: One Week     Standing Status:   Standing     Number of Occurrences:   1     Order Specific Question:   Appointment in     Answer: One Week        Significant Diagnostic Studies:     CT HEAD WO CONT   Final Result   Periventricular white matter gliosis, evidence for nonacute end   vessel occlusive change. Central cerebral arteriosclerosis. No intracranial hemorrhage. XR CHEST PORT   Final Result          No results found for this or any previous visit (from the past 24 hour(s)). Discharge Medications:  Current Discharge Medication List        START taking these medications    Details   dilTIAZem ER (CARDIZEM CD) 180 mg capsule Take 1 Capsule by mouth daily for 30 days. Qty: 30 Capsule, Refills: 0  Start date: 9/18/2021, End date: 10/18/2021      rivastigmine (EXELON) 4.6 mg/24 hour patch 1 Patch by TransDERmal route daily for 30 days. Qty: 30 Patch, Refills: 0  Start date: 9/18/2021, End date: 10/18/2021           CONTINUE these medications which have NOT CHANGED    Details   losartan (COZAAR) 50 mg tablet Take 50 mg by mouth daily. apixaban (Eliquis) 5 mg tablet Take 5 mg by mouth two (2) times a day.               Time Spent on Discharge: More than 30 minutes

## 2021-09-17 NOTE — PROGRESS NOTES
Dc instructions given to daughter Blas Stauffer. Verbalized understandingpatient on room air. No distress noted. Discharge plan of care/case management plan validated with provider discharge order. dc home via ambulance.

## 2021-09-19 LAB
BACTERIA SPEC CULT: ABNORMAL
COLONY COUNT,CNT: ABNORMAL
SPECIAL REQUESTS,SREQ: ABNORMAL

## 2022-02-25 ENCOUNTER — APPOINTMENT (OUTPATIENT)
Dept: CT IMAGING | Age: 87
DRG: 312 | End: 2022-02-25
Attending: EMERGENCY MEDICINE
Payer: MEDICARE

## 2022-02-25 ENCOUNTER — APPOINTMENT (OUTPATIENT)
Dept: NON INVASIVE DIAGNOSTICS | Age: 87
DRG: 312 | End: 2022-02-25
Attending: PHYSICIAN ASSISTANT
Payer: MEDICARE

## 2022-02-25 ENCOUNTER — HOSPITAL ENCOUNTER (INPATIENT)
Age: 87
LOS: 2 days | Discharge: HOME HEALTH | DRG: 312 | End: 2022-03-01
Attending: EMERGENCY MEDICINE | Admitting: INTERNAL MEDICINE
Payer: MEDICARE

## 2022-02-25 DIAGNOSIS — R55 SYNCOPE, UNSPECIFIED SYNCOPE TYPE: Primary | ICD-10-CM

## 2022-02-25 LAB
ALBUMIN SERPL-MCNC: 2.5 G/DL (ref 3.5–5)
ALBUMIN/GLOB SERPL: 0.6 {RATIO} (ref 1.1–2.2)
ALP SERPL-CCNC: 79 U/L (ref 45–117)
ALT SERPL-CCNC: 21 U/L (ref 12–78)
ANION GAP SERPL CALC-SCNC: 6 MMOL/L (ref 5–15)
AST SERPL W P-5'-P-CCNC: 27 U/L (ref 15–37)
ATRIAL RATE: 93 BPM
BASOPHILS # BLD: 0.1 K/UL (ref 0–0.1)
BASOPHILS NFR BLD: 1 % (ref 0–1)
BILIRUB SERPL-MCNC: 0.8 MG/DL (ref 0.2–1)
BNP SERPL-MCNC: 53 PG/ML
BUN SERPL-MCNC: 9 MG/DL (ref 6–20)
BUN/CREAT SERPL: 12 (ref 12–20)
CA-I BLD-MCNC: 9.1 MG/DL (ref 8.5–10.1)
CALCULATED P AXIS, ECG09: 53 DEGREES
CALCULATED R AXIS, ECG10: 29 DEGREES
CALCULATED T AXIS, ECG11: 60 DEGREES
CHLORIDE SERPL-SCNC: 112 MMOL/L (ref 97–108)
CO2 SERPL-SCNC: 26 MMOL/L (ref 21–32)
CREAT SERPL-MCNC: 0.78 MG/DL (ref 0.55–1.02)
DIAGNOSIS, 93000: NORMAL
DIFFERENTIAL METHOD BLD: ABNORMAL
ECHO AO ASC DIAM: 3.1 CM
ECHO AO ASCENDING AORTA INDEX: 1.46 CM/M2
ECHO AO ROOT DIAM: 2.7 CM
ECHO AO ROOT INDEX: 1.27 CM/M2
ECHO AV AREA PEAK VELOCITY: 1.5 CM2
ECHO AV AREA VTI: 1.5 CM2
ECHO AV AREA/BSA PEAK VELOCITY: 0.7 CM2/M2
ECHO AV AREA/BSA VTI: 0.7 CM2/M2
ECHO AV MEAN GRADIENT: 3 MMHG
ECHO AV MEAN VELOCITY: 0.8 M/S
ECHO AV PEAK GRADIENT: 5 MMHG
ECHO AV PEAK VELOCITY: 1.2 M/S
ECHO AV VELOCITY RATIO: 0.75
ECHO AV VTI: 16.2 CM
ECHO LA DIAMETER INDEX: 1.27 CM/M2
ECHO LA DIAMETER: 2.7 CM
ECHO LA TO AORTIC ROOT RATIO: 1
ECHO LV E' LATERAL VELOCITY: 8 CM/S
ECHO LV E' SEPTAL VELOCITY: 11 CM/S
ECHO LV FRACTIONAL SHORTENING: 14 % (ref 28–44)
ECHO LV INTERNAL DIMENSION DIASTOLE INDEX: 1.03 CM/M2
ECHO LV INTERNAL DIMENSION DIASTOLIC: 2.2 CM (ref 3.9–5.3)
ECHO LV INTERNAL DIMENSION SYSTOLIC INDEX: 0.89 CM/M2
ECHO LV INTERNAL DIMENSION SYSTOLIC: 1.9 CM
ECHO LV IVSD: 1.3 CM (ref 0.6–0.9)
ECHO LV MASS 2D: 67.6 G (ref 67–162)
ECHO LV MASS INDEX 2D: 31.7 G/M2 (ref 43–95)
ECHO LV POSTERIOR WALL DIASTOLIC: 1 CM (ref 0.6–0.9)
ECHO LV RELATIVE WALL THICKNESS RATIO: 0.91
ECHO LVOT AREA: 2 CM2
ECHO LVOT AV VTI INDEX: 0.72
ECHO LVOT DIAM: 1.6 CM
ECHO LVOT MEAN GRADIENT: 2 MMHG
ECHO LVOT PEAK GRADIENT: 3 MMHG
ECHO LVOT PEAK VELOCITY: 0.9 M/S
ECHO LVOT STROKE VOLUME INDEX: 11 ML/M2
ECHO LVOT SV: 23.5 ML
ECHO LVOT VTI: 11.7 CM
ECHO MV A VELOCITY: 1.06 M/S
ECHO MV E VELOCITY: 0.5 M/S
ECHO MV E/A RATIO: 0.47
ECHO MV E/E' LATERAL: 6.25
ECHO MV E/E' RATIO (AVERAGED): 5.4
ECHO MV E/E' SEPTAL: 4.55
ECHO RV BASAL DIMENSION: 3.4 CM
ECHO RV MID DIMENSION: 1.8 CM
ECHO TV REGURGITANT MAX VELOCITY: 0.97 M/S
ECHO TV REGURGITANT PEAK GRADIENT: 4 MMHG
EOSINOPHIL # BLD: 0.1 K/UL (ref 0–0.4)
EOSINOPHIL NFR BLD: 1 % (ref 0–7)
ERYTHROCYTE [DISTWIDTH] IN BLOOD BY AUTOMATED COUNT: 15.9 % (ref 11.5–14.5)
GLOBULIN SER CALC-MCNC: 4.2 G/DL (ref 2–4)
GLUCOSE SERPL-MCNC: 200 MG/DL (ref 65–100)
HCT VFR BLD AUTO: 48.1 % (ref 35–47)
HGB BLD-MCNC: 14.6 G/DL (ref 11.5–16)
IMM GRANULOCYTES # BLD AUTO: 0 K/UL (ref 0–0.04)
IMM GRANULOCYTES NFR BLD AUTO: 0 % (ref 0–0.5)
LYMPHOCYTES # BLD: 4.7 K/UL (ref 0.8–3.5)
LYMPHOCYTES NFR BLD: 39 % (ref 12–49)
MCH RBC QN AUTO: 26.9 PG (ref 26–34)
MCHC RBC AUTO-ENTMCNC: 30.4 G/DL (ref 30–36.5)
MCV RBC AUTO: 88.7 FL (ref 80–99)
MONOCYTES # BLD: 0.6 K/UL (ref 0–1)
MONOCYTES NFR BLD: 5 % (ref 5–13)
NEUTS SEG # BLD: 6.5 K/UL (ref 1.8–8)
NEUTS SEG NFR BLD: 54 % (ref 32–75)
NRBC # BLD: 0 K/UL (ref 0–0.01)
NRBC BLD-RTO: 0 PER 100 WBC
P-R INTERVAL, ECG05: 158 MS
PLATELET # BLD AUTO: 209 K/UL (ref 150–400)
PMV BLD AUTO: 13 FL (ref 8.9–12.9)
POTASSIUM SERPL-SCNC: 3.4 MMOL/L (ref 3.5–5.1)
PROT SERPL-MCNC: 6.7 G/DL (ref 6.4–8.2)
Q-T INTERVAL, ECG07: 394 MS
QRS DURATION, ECG06: 82 MS
QTC CALCULATION (BEZET), ECG08: 489 MS
RBC # BLD AUTO: 5.42 M/UL (ref 3.8–5.2)
SODIUM SERPL-SCNC: 144 MMOL/L (ref 136–145)
TROPONIN-HIGH SENSITIVITY: 12 NG/L (ref 0–51)
VENTRICULAR RATE, ECG03: 93 BPM
WBC # BLD AUTO: 11.9 K/UL (ref 3.6–11)

## 2022-02-25 PROCEDURE — 74011250637 HC RX REV CODE- 250/637: Performed by: PHYSICIAN ASSISTANT

## 2022-02-25 PROCEDURE — G0378 HOSPITAL OBSERVATION PER HR: HCPCS

## 2022-02-25 PROCEDURE — 84484 ASSAY OF TROPONIN QUANT: CPT

## 2022-02-25 PROCEDURE — 36415 COLL VENOUS BLD VENIPUNCTURE: CPT

## 2022-02-25 PROCEDURE — 99285 EMERGENCY DEPT VISIT HI MDM: CPT

## 2022-02-25 PROCEDURE — 80053 COMPREHEN METABOLIC PANEL: CPT

## 2022-02-25 PROCEDURE — 93306 TTE W/DOPPLER COMPLETE: CPT

## 2022-02-25 PROCEDURE — 70450 CT HEAD/BRAIN W/O DYE: CPT

## 2022-02-25 PROCEDURE — 96374 THER/PROPH/DIAG INJ IV PUSH: CPT

## 2022-02-25 PROCEDURE — 74011000250 HC RX REV CODE- 250: Performed by: PHYSICIAN ASSISTANT

## 2022-02-25 PROCEDURE — 83880 ASSAY OF NATRIURETIC PEPTIDE: CPT

## 2022-02-25 PROCEDURE — 85025 COMPLETE CBC W/AUTO DIFF WBC: CPT

## 2022-02-25 PROCEDURE — 74011250636 HC RX REV CODE- 250/636: Performed by: EMERGENCY MEDICINE

## 2022-02-25 PROCEDURE — 93005 ELECTROCARDIOGRAM TRACING: CPT

## 2022-02-25 PROCEDURE — 74011250636 HC RX REV CODE- 250/636: Performed by: PHYSICIAN ASSISTANT

## 2022-02-25 RX ORDER — SODIUM CHLORIDE 0.9 % (FLUSH) 0.9 %
5-40 SYRINGE (ML) INJECTION EVERY 8 HOURS
Status: DISCONTINUED | OUTPATIENT
Start: 2022-02-25 | End: 2022-02-26 | Stop reason: SDUPTHER

## 2022-02-25 RX ORDER — POLYETHYLENE GLYCOL 3350 17 G/17G
17 POWDER, FOR SOLUTION ORAL DAILY PRN
Status: DISCONTINUED | OUTPATIENT
Start: 2022-02-25 | End: 2022-03-01 | Stop reason: HOSPADM

## 2022-02-25 RX ORDER — ACETAMINOPHEN 650 MG/1
650 SUPPOSITORY RECTAL
Status: DISCONTINUED | OUTPATIENT
Start: 2022-02-25 | End: 2022-03-01 | Stop reason: HOSPADM

## 2022-02-25 RX ORDER — ACETAMINOPHEN 325 MG/1
650 TABLET ORAL
Status: DISCONTINUED | OUTPATIENT
Start: 2022-02-25 | End: 2022-03-01 | Stop reason: HOSPADM

## 2022-02-25 RX ORDER — SODIUM CHLORIDE 9 MG/ML
75 INJECTION, SOLUTION INTRAVENOUS CONTINUOUS
Status: DISCONTINUED | OUTPATIENT
Start: 2022-02-25 | End: 2022-02-26

## 2022-02-25 RX ORDER — RIVASTIGMINE 4.6 MG/24H
1 PATCH, EXTENDED RELEASE TRANSDERMAL DAILY
COMMUNITY
Start: 2022-02-06

## 2022-02-25 RX ORDER — ONDANSETRON 4 MG/1
4 TABLET, ORALLY DISINTEGRATING ORAL
Status: DISCONTINUED | OUTPATIENT
Start: 2022-02-25 | End: 2022-03-01 | Stop reason: HOSPADM

## 2022-02-25 RX ORDER — SODIUM CHLORIDE 0.9 % (FLUSH) 0.9 %
5-40 SYRINGE (ML) INJECTION AS NEEDED
Status: DISCONTINUED | OUTPATIENT
Start: 2022-02-25 | End: 2022-02-26 | Stop reason: SDUPTHER

## 2022-02-25 RX ORDER — METOPROLOL TARTRATE 5 MG/5ML
5 INJECTION INTRAVENOUS
Status: DISCONTINUED | OUTPATIENT
Start: 2022-02-25 | End: 2022-03-01 | Stop reason: HOSPADM

## 2022-02-25 RX ORDER — POTASSIUM CHLORIDE 20 MEQ/1
20 TABLET, EXTENDED RELEASE ORAL ONCE
Status: ACTIVE | OUTPATIENT
Start: 2022-02-25 | End: 2022-02-26

## 2022-02-25 RX ORDER — SODIUM CHLORIDE 0.9 % (FLUSH) 0.9 %
5-40 SYRINGE (ML) INJECTION AS NEEDED
Status: DISCONTINUED | OUTPATIENT
Start: 2022-02-25 | End: 2022-03-01 | Stop reason: HOSPADM

## 2022-02-25 RX ORDER — ONDANSETRON 2 MG/ML
4 INJECTION INTRAMUSCULAR; INTRAVENOUS
Status: DISCONTINUED | OUTPATIENT
Start: 2022-02-25 | End: 2022-03-01 | Stop reason: HOSPADM

## 2022-02-25 RX ADMIN — APIXABAN 5 MG: 5 TABLET, FILM COATED ORAL at 20:47

## 2022-02-25 RX ADMIN — SODIUM CHLORIDE 1000 ML: 9 INJECTION, SOLUTION INTRAVENOUS at 13:00

## 2022-02-25 RX ADMIN — METOPROLOL TARTRATE 5 MG: 5 INJECTION INTRAVENOUS at 16:26

## 2022-02-25 RX ADMIN — SODIUM CHLORIDE 75 ML/HR: 9 INJECTION, SOLUTION INTRAVENOUS at 16:30

## 2022-02-25 RX ADMIN — SODIUM CHLORIDE, PRESERVATIVE FREE 10 ML: 5 INJECTION INTRAVENOUS at 16:15

## 2022-02-25 RX ADMIN — SODIUM CHLORIDE 75 ML/HR: 9 INJECTION, SOLUTION INTRAVENOUS at 20:43

## 2022-02-25 NOTE — PROGRESS NOTES
Patient in room 416, accompanied by daughter, alert to self, smacking and hitting at RN/writer, Purewick in place, small rash noted to sacrum, orange cream applied, bed alarm on

## 2022-02-25 NOTE — ED PROVIDER NOTES
EMERGENCY DEPARTMENT HISTORY AND PHYSICAL EXAM        Date: 2/25/2022  Patient Name: Cha Quiñonez    History of Presenting Illness     Chief Complaint   Patient presents with    Syncope       History Provided By: Patient's Daughter, EMS    HPI: Cha Quiñonez, 80 y.o. female with history of hypertension who presents with syncopal type episode. Patient was about 4:30 AM and was getting a bath. She went to the restroom. While she was sitting she lost consciousness with her daughter present. She did not fall to the ground or hit her head. No injuries. Patient has had syncopal episodes in the past.  Since this episode she has not been totally alert but has been responsive to painful stimuli. PCP: Ruth Barbour MD    Current Facility-Administered Medications   Medication Dose Route Frequency Provider Last Rate Last Admin    sodium chloride 0.9 % bolus infusion 1,000 mL  1,000 mL IntraVENous ONCE Maribel Underwood, DO         Current Outpatient Medications   Medication Sig Dispense Refill    losartan (COZAAR) 50 mg tablet Take 50 mg by mouth daily.  apixaban (Eliquis) 5 mg tablet Take 5 mg by mouth two (2) times a day. Past History     Past Medical History:  Hypertension    Past Surgical History:  Reviewed and noncontributory    Family History:  Reviewed and noncontributory    Social History:  Social History     Tobacco Use    Smoking status: Not on file    Smokeless tobacco: Not on file   Substance Use Topics    Alcohol use: Not on file    Drug use: Not on file       Allergies:  No Known Allergies      Review of Systems   Review of Systems   Unable to perform ROS: Mental status change     Physical Exam   Constitutional: Well-nourished. Skin: No rash. ENT: No rhinorrhea. No cough. Head is normocephalic and atraumatic. Eye: No proptosis or conjunctival injections. Respiratory: No apparent respiratory distress. Gastrointestinal: Nondistended.   Musculoskeletal: No obvious bony deformities. Neuro: Moves all 4 extremities spontaneously. Withdraws to pain and moans to pain. Diagnostic Study Results     Labs -     Recent Results (from the past 24 hour(s))   METABOLIC PANEL, COMPREHENSIVE    Collection Time: 02/25/22 11:25 AM   Result Value Ref Range    Sodium 144 136 - 145 mmol/L    Potassium 3.4 (L) 3.5 - 5.1 mmol/L    Chloride 112 (H) 97 - 108 mmol/L    CO2 26 21 - 32 mmol/L    Anion gap 6 5 - 15 mmol/L    Glucose 200 (H) 65 - 100 mg/dL    BUN 9 6 - 20 mg/dL    Creatinine 0.78 0.55 - 1.02 mg/dL    BUN/Creatinine ratio 12 12 - 20      GFR est AA >60 >60 ml/min/1.73m2    GFR est non-AA >60 >60 ml/min/1.73m2    Calcium 9.1 8.5 - 10.1 mg/dL    Bilirubin, total 0.8 0.2 - 1.0 mg/dL    AST (SGOT) 27 15 - 37 U/L    ALT (SGPT) 21 12 - 78 U/L    Alk. phosphatase 79 45 - 117 U/L    Protein, total 6.7 6.4 - 8.2 g/dL    Albumin 2.5 (L) 3.5 - 5.0 g/dL    Globulin 4.2 (H) 2.0 - 4.0 g/dL    A-G Ratio 0.6 (L) 1.1 - 2.2     CBC WITH AUTOMATED DIFF    Collection Time: 02/25/22 11:25 AM   Result Value Ref Range    WBC 11.9 (H) 3.6 - 11.0 K/uL    RBC 5.42 (H) 3.80 - 5.20 M/uL    HGB 14.6 11.5 - 16.0 g/dL    HCT 48.1 (H) 35.0 - 47.0 %    MCV 88.7 80.0 - 99.0 FL    MCH 26.9 26.0 - 34.0 PG    MCHC 30.4 30.0 - 36.5 g/dL    RDW 15.9 (H) 11.5 - 14.5 %    PLATELET 792 117 - 600 K/uL    MPV 13.0 (H) 8.9 - 12.9 FL    NRBC 0.0 0.0  WBC    ABSOLUTE NRBC 0.00 0.00 - 0.01 K/uL    NEUTROPHILS 54 32 - 75 %    LYMPHOCYTES 39 12 - 49 %    MONOCYTES 5 5 - 13 %    EOSINOPHILS 1 0 - 7 %    BASOPHILS 1 0 - 1 %    IMMATURE GRANULOCYTES 0 0 - 0.5 %    ABS. NEUTROPHILS 6.5 1.8 - 8.0 K/UL    ABS. LYMPHOCYTES 4.7 (H) 0.8 - 3.5 K/UL    ABS. MONOCYTES 0.6 0.0 - 1.0 K/UL    ABS. EOSINOPHILS 0.1 0.0 - 0.4 K/UL    ABS. BASOPHILS 0.1 0.0 - 0.1 K/UL    ABS. IMM.  GRANS. 0.0 0.00 - 0.04 K/UL    DF AUTOMATED     TROPONIN-HIGH SENSITIVITY    Collection Time: 02/25/22 11:25 AM   Result Value Ref Range    Troponin-High Sensitivity 12 0 - 51 ng/L   NT-PRO BNP    Collection Time: 02/25/22 11:25 AM   Result Value Ref Range    NT pro-BNP 53 <450 pg/mL       Radiologic Studies -   CT HEAD WO CONT   Final Result   Age-appropriate atrophy. No acute findings. CT Results  (Last 48 hours)               02/25/22 1338  CT HEAD WO CONT Final result    Impression:  Age-appropriate atrophy. No acute findings. Narrative:  CT dose reduction was achieved through use of a standardized protocol tailored   for this examination and automatic exposure control for dose modulation. CT Head       History:        The sulci are prominent but symmetric. Periventricular and deep white matter   hypodensity is not unexpected for age. No acute abnormality seen gray or white   matter. Ventricles are symmetric and appropriate for atrophy. There is no   midline shift or mass effect, or evidence of hemorrhage. Bone windows show no   fracture. CXR Results  (Last 48 hours)    None          Medical Decision Making and ED Course     I reviewed the available vital signs, nursing notes, past medical history, past surgical history, family history, and social history. Vital Signs - Reviewed the patient's vital signs. Patient Vitals for the past 12 hrs:   Temp Pulse Resp BP SpO2   02/25/22 1204  86 21 (!) 123/59 94 %   02/25/22 1122 98.1 °F (36.7 °C) 91 28 (!) 99/54 98 %       EKG interpretation: Obtained on 2/25/2022 at 1117. Read at 1126. Normal sinus rhythm at rate of 93 bpm.  Normal MA interval, QRS duration, QTc interval.  No ST segment abnormalities. Normal axis. Medical Decision Making:   Presented with syncopal type episode. The differential diagnosis is syncope, seizure, orthostatic hypotension, vasovagal episode, intracranial hemorrhage, dementia. Unclear etiology of syncopal episode. Orthostatic hypotension is on the differential as well as vasovagal episode or seizure. Work-up is negative.   Patient has had multiple episodes of this in the past according to family member. She was altered here temporarily. She is now back to her baseline. Due to prolonged altered mental status and syncopal episode I feel observation would be reasonable. Discussed with hospitalist will admit. Disposition     Admitted to hospitalist    Diagnosis     Clinical impression:   1. Syncope, unspecified syncope type       Attestation:  Please note that this dictation was completed with [employer], the computer voice recognition software. Quite often unanticipated grammatical, syntax, homophones, and other interpretive errors are inadvertently transcribed by the computer software. Please disregard these errors. Please excuse any errors that have escaped final proofreading. Thank you.   Herschel Phalen, DO

## 2022-02-25 NOTE — ED NOTES
Pt more responsive at this time. Pt responds to name and will answer yes. Opt bp increased to 176/110 Brian Leija NP notified and order for iv metoprolol given. Writer administered at this time.  5mg Metoprolol given pt in no acute distress, writer will continue to monitor

## 2022-02-25 NOTE — ED NOTES
TRANSFER - OUT REPORT:    Verbal report given to LAMONTE Mendoza (name) on Sophie Ponce  being transferred to [address] [place] [address] Rd (unit) for routine progression of care       Report consisted of patients Situation, Background, Assessment and   Recommendations(SBAR). Information from the following report(s) SBAR, Kardex and ED Summary was reviewed with the receiving nurse. Lines:   Peripheral IV 02/25/22 Anterior;Right Forearm (Active)        Opportunity for questions and clarification was provided.       Patient transported with:   Monitor  Tech

## 2022-02-25 NOTE — H&P
History and Physical    Patient: Terry Demarco MRN: [id number]  SSN: [id number]    YOB: 1933  Age: 80 y.o. Sex: female      Subjective:      Terry Demarco is a 80 y.o. female with a past medical history of saddle PE and hypertension that presented to the emergency room on 2/25/2022 after a syncopal episode. Most of the information is gathered through daughter at bedside. Her daughter patient was sitting at the bedside commode and she noticed that her mother was not acting right. She then passed out. She said it was for about 15 to 20 minutes. Patient did not have any chest pain, shortness of breath, headache preceding the event. She did not fall or hit her head. Of note patient is on Eliquis 5 mg twice daily after being diagnosed with a saddle PE in 2017. In the ED vital signs showed hypotension with normal heart rate. Laboratory data was significant for a potassium of 3.4. Troponin x1 -. BNP 53. CT of the head showed no acute intracranial abnormality. EKG did not show ischemic changes. It was recommended admission overnight. Started on IV fluids    No past medical history on file. Pulmonary embolism and hypertension  No past surgical history on file. Total hysterectomy  No family history on file. Denies  Social History     Tobacco Use    Smoking status: Not on file    Smokeless tobacco: Not on file   Substance Use Topics    Alcohol use: Not on file      Patient is not a smoker or drink alcohol      Prior to Admission medications    Medication Sig Start Date End Date Taking? Authorizing Provider   losartan (COZAAR) 50 mg tablet Take 50 mg by mouth daily. Briseida Marcus MD   apixaban (Eliquis) 5 mg tablet Take 5 mg by mouth two (2) times a day.     Briseida Marcus MD        No Known Allergies    Review of Systems:  Constitutional: No fevers, No chills, No fatigue, ++ weakness  Eyes: No visual disturbance  Ears, Nose, Mouth, Throat, and Face: No nasal congestion, No sore throat  Respiratory: No cough, No sputum, No wheezing, No SOB  Cardiovascular: No chest pain, No lower extremity edema, No Palpitations   Gastrointestinal: No nausea, No vomiting, No diarrhea, No constipation, No abdominal pain  Genitourinary: No frequency, No dysuria, No hematuria  Integument/Breast: No rash, No skin lesion(s), No dryness  Musculoskeletal: No arthralgias, No neck pain, No back pain  Neurological: No headaches, No dizziness, No confusion,  No seizures  Behavioral/Psychiatric: No anxiety, No depression      Objective:     Vitals:    02/25/22 1122 02/25/22 1204   BP: (!) 99/54 (!) 123/59   Pulse: 91 86   Resp: 28 21   Temp: 98.1 °F (36.7 °C)    SpO2: 98% 94%   Weight: 99.8 kg (220 lb)    Height: 5' 8\" (1.727 m)         Physical Exam:  General: alert, cooperative, no distress  Eye: conjunctivae/corneas clear. PERRL, EOM's intact. Throat and Neck: normal and no erythema or exudates noted. No mass   Lung: clear to auscultation bilaterally  Heart: regular rate and rhythm,   Abdomen: soft, non-tender. Bowel sounds normal. No masses,  Extremities:  able to move all extremities normal, atraumatic  Skin: Normal.  Neurologic: AOx3. Cranial nerves 2-12 and sensation grossly intact. Psychiatric: non focal    Recent Results (from the past 24 hour(s))   METABOLIC PANEL, COMPREHENSIVE    Collection Time: 02/25/22 11:25 AM   Result Value Ref Range    Sodium 144 136 - 145 mmol/L    Potassium 3.4 (L) 3.5 - 5.1 mmol/L    Chloride 112 (H) 97 - 108 mmol/L    CO2 26 21 - 32 mmol/L    Anion gap 6 5 - 15 mmol/L    Glucose 200 (H) 65 - 100 mg/dL    BUN 9 6 - 20 mg/dL    Creatinine 0.78 0.55 - 1.02 mg/dL    BUN/Creatinine ratio 12 12 - 20      GFR est AA >60 >60 ml/min/1.73m2    GFR est non-AA >60 >60 ml/min/1.73m2    Calcium 9.1 8.5 - 10.1 mg/dL    Bilirubin, total 0.8 0.2 - 1.0 mg/dL    AST (SGOT) 27 15 - 37 U/L    ALT (SGPT) 21 12 - 78 U/L    Alk.  phosphatase 79 45 - 117 U/L    Protein, total 6.7 6.4 - 8.2 g/dL Albumin 2.5 (L) 3.5 - 5.0 g/dL    Globulin 4.2 (H) 2.0 - 4.0 g/dL    A-G Ratio 0.6 (L) 1.1 - 2.2     CBC WITH AUTOMATED DIFF    Collection Time: 02/25/22 11:25 AM   Result Value Ref Range    WBC 11.9 (H) 3.6 - 11.0 K/uL    RBC 5.42 (H) 3.80 - 5.20 M/uL    HGB 14.6 11.5 - 16.0 g/dL    HCT 48.1 (H) 35.0 - 47.0 %    MCV 88.7 80.0 - 99.0 FL    MCH 26.9 26.0 - 34.0 PG    MCHC 30.4 30.0 - 36.5 g/dL    RDW 15.9 (H) 11.5 - 14.5 %    PLATELET 230 900 - 582 K/uL    MPV 13.0 (H) 8.9 - 12.9 FL    NRBC 0.0 0.0  WBC    ABSOLUTE NRBC 0.00 0.00 - 0.01 K/uL    NEUTROPHILS 54 32 - 75 %    LYMPHOCYTES 39 12 - 49 %    MONOCYTES 5 5 - 13 %    EOSINOPHILS 1 0 - 7 %    BASOPHILS 1 0 - 1 %    IMMATURE GRANULOCYTES 0 0 - 0.5 %    ABS. NEUTROPHILS 6.5 1.8 - 8.0 K/UL    ABS. LYMPHOCYTES 4.7 (H) 0.8 - 3.5 K/UL    ABS. MONOCYTES 0.6 0.0 - 1.0 K/UL    ABS. EOSINOPHILS 0.1 0.0 - 0.4 K/UL    ABS. BASOPHILS 0.1 0.0 - 0.1 K/UL    ABS. IMM. GRANS. 0.0 0.00 - 0.04 K/UL    DF AUTOMATED     TROPONIN-HIGH SENSITIVITY    Collection Time: 02/25/22 11:25 AM   Result Value Ref Range    Troponin-High Sensitivity 12 0 - 51 ng/L   NT-PRO BNP    Collection Time: 02/25/22 11:25 AM   Result Value Ref Range    NT pro-BNP 53 <450 pg/mL       XR Results (maximum last 3): Results from [facility] encounter on 09/15/21    XR CHEST PORT    Narrative  Chest single view. Lungs clear; no interstitial or alveolar pulmonary edema. Cardiac and  mediastinal structures are magnified on this AP view. Thoracic aorta  atherosclerosis. No pneumothorax or sizable pleural effusion. CT Results (maximum last 3): Results from [place] [facility] beds at the [facility] encounter on 02/25/22    CT HEAD WO CONT    Narrative  CT dose reduction was achieved through use of a standardized protocol tailored  for this examination and automatic exposure control for dose modulation. CT Head    History:    The sulci are prominent but symmetric.  Periventricular and deep white matter  hypodensity is not unexpected for age. No acute abnormality seen gray or white  matter. Ventricles are symmetric and appropriate for atrophy. There is no  midline shift or mass effect, or evidence of hemorrhage. Bone windows show no  fracture. Impression  Age-appropriate atrophy. No acute findings. Results from [place] encounter on 09/15/21    CT HEAD WO CONT    Narrative  CT head. Axial images are reviewed along with reformatted sagittal/coronal images. Dose reduction: All CT scans at this facility are performed using dose reduction  optimization techniques as appropriate to a performed exam including the  following-  automated exposure control, adjustments of mA and/or Kv according to patient  size, or use of iterative reconstructive technique. Bone windows demonstrate normal aeration imaged sinuses mastoid air cells. Review of intracranial content reveals ventricular and sulcal prominence related  to cerebral atrophy. Periventricular white matter gliosis, evidence to suggest  nonacute end vessel occlusive change. Bilateral basal ganglia calcification. Central cerebral arteriosclerosis. Falx-based calcification. No intracranial  hemorrhage. Impression  Periventricular white matter gliosis, evidence for nonacute end  vessel occlusive change. Central cerebral arteriosclerosis. No intracranial hemorrhage. MRI Results (maximum last 3): No results found for this or any previous visit. Nuclear Medicine Results (maximum last 3): No results found for this or any previous visit. US Results (maximum last 3): No results found for this or any previous visit. Assessment:   1. Syncopal episode most likely vasovagal  2. Hypertension with hypotensive episode  3. History of PE  4. Hypokalemia    Plan:   1.  CT of the head negative. We will continue cardiac telemetry. Check orthostatics. Continue with IV hydration. 2.  Blood pressure is on the lower side.   We will hold patient's losartan. Get a 2D echo  3. Patient is on Eliquis 5 mg twice daily. We will check a D-dimer. Low suspicion for PE as patient is not tachycardic, hypoxic, and appropriately anticoagulated. Hemoglobin stable. No signs of active bleeding  4. Potassium low at 3.4. Replenish  5.  PT OT  6 CBC BMP in a.m.       Full code    DVT prophylaxis Eliquis  GI prophylaxis tolerating oral diet    Total Time: 61 min     Signed By: Tori Cottrell PA-C     February 25, 2022

## 2022-02-26 LAB
ANION GAP SERPL CALC-SCNC: 10 MMOL/L (ref 5–15)
BASOPHILS # BLD: 0 K/UL (ref 0–0.1)
BASOPHILS NFR BLD: 0 % (ref 0–1)
BUN SERPL-MCNC: 6 MG/DL (ref 6–20)
BUN/CREAT SERPL: 10 (ref 12–20)
CA-I BLD-MCNC: 8.6 MG/DL (ref 8.5–10.1)
CHLORIDE SERPL-SCNC: 115 MMOL/L (ref 97–108)
CO2 SERPL-SCNC: 21 MMOL/L (ref 21–32)
CREAT SERPL-MCNC: 0.6 MG/DL (ref 0.55–1.02)
D DIMER PPP FEU-MCNC: 0.36 UG/ML(FEU)
DIFFERENTIAL METHOD BLD: ABNORMAL
EOSINOPHIL # BLD: 0 K/UL (ref 0–0.4)
EOSINOPHIL NFR BLD: 0 % (ref 0–7)
ERYTHROCYTE [DISTWIDTH] IN BLOOD BY AUTOMATED COUNT: 15.8 % (ref 11.5–14.5)
GLUCOSE SERPL-MCNC: 77 MG/DL (ref 65–100)
HCT VFR BLD AUTO: 45 % (ref 35–47)
HGB BLD-MCNC: 14 G/DL (ref 11.5–16)
IMM GRANULOCYTES # BLD AUTO: 0 K/UL
IMM GRANULOCYTES NFR BLD AUTO: 0 %
LYMPHOCYTES # BLD: 2.1 K/UL (ref 0.8–3.5)
LYMPHOCYTES NFR BLD: 17 % (ref 12–49)
MCH RBC QN AUTO: 27.7 PG (ref 26–34)
MCHC RBC AUTO-ENTMCNC: 31.1 G/DL (ref 30–36.5)
MCV RBC AUTO: 89.1 FL (ref 80–99)
MONOCYTES # BLD: 0.6 K/UL (ref 0–1)
MONOCYTES NFR BLD: 5 % (ref 5–13)
NEUTS SEG # BLD: 9.9 K/UL (ref 1.8–8)
NEUTS SEG NFR BLD: 78 % (ref 32–75)
NRBC # BLD: 0 K/UL (ref 0–0.01)
NRBC BLD-RTO: 0 PER 100 WBC
PLATELET # BLD AUTO: 197 K/UL (ref 150–400)
PLATELET COMMENTS,PCOM: ABNORMAL
POTASSIUM SERPL-SCNC: 3.4 MMOL/L (ref 3.5–5.1)
RBC # BLD AUTO: 5.05 M/UL (ref 3.8–5.2)
RBC MORPH BLD: ABNORMAL
SODIUM SERPL-SCNC: 146 MMOL/L (ref 136–145)
WBC # BLD AUTO: 12.6 K/UL (ref 3.6–11)

## 2022-02-26 PROCEDURE — 80048 BASIC METABOLIC PNL TOTAL CA: CPT

## 2022-02-26 PROCEDURE — 36415 COLL VENOUS BLD VENIPUNCTURE: CPT

## 2022-02-26 PROCEDURE — 74011000250 HC RX REV CODE- 250: Performed by: STUDENT IN AN ORGANIZED HEALTH CARE EDUCATION/TRAINING PROGRAM

## 2022-02-26 PROCEDURE — 74011000250 HC RX REV CODE- 250: Performed by: PHYSICIAN ASSISTANT

## 2022-02-26 PROCEDURE — 97162 PT EVAL MOD COMPLEX 30 MIN: CPT

## 2022-02-26 PROCEDURE — 85025 COMPLETE CBC W/AUTO DIFF WBC: CPT

## 2022-02-26 PROCEDURE — 74011250636 HC RX REV CODE- 250/636: Performed by: STUDENT IN AN ORGANIZED HEALTH CARE EDUCATION/TRAINING PROGRAM

## 2022-02-26 PROCEDURE — G0378 HOSPITAL OBSERVATION PER HR: HCPCS

## 2022-02-26 PROCEDURE — 85379 FIBRIN DEGRADATION QUANT: CPT

## 2022-02-26 PROCEDURE — 96375 TX/PRO/DX INJ NEW DRUG ADDON: CPT

## 2022-02-26 PROCEDURE — 96376 TX/PRO/DX INJ SAME DRUG ADON: CPT

## 2022-02-26 PROCEDURE — 97530 THERAPEUTIC ACTIVITIES: CPT

## 2022-02-26 PROCEDURE — 74011250637 HC RX REV CODE- 250/637: Performed by: PHYSICIAN ASSISTANT

## 2022-02-26 RX ORDER — SODIUM CHLORIDE 450 MG/100ML
50 INJECTION, SOLUTION INTRAVENOUS CONTINUOUS
Status: DISCONTINUED | OUTPATIENT
Start: 2022-02-26 | End: 2022-03-01 | Stop reason: HOSPADM

## 2022-02-26 RX ORDER — POTASSIUM CHLORIDE 20 MEQ/1
40 TABLET, EXTENDED RELEASE ORAL DAILY
Status: DISCONTINUED | OUTPATIENT
Start: 2022-02-27 | End: 2022-03-01 | Stop reason: HOSPADM

## 2022-02-26 RX ORDER — LOSARTAN POTASSIUM 50 MG/1
50 TABLET ORAL DAILY
Status: DISCONTINUED | OUTPATIENT
Start: 2022-02-27 | End: 2022-02-28

## 2022-02-26 RX ORDER — RIVASTIGMINE 4.6 MG/24H
1 PATCH, EXTENDED RELEASE TRANSDERMAL DAILY
Status: DISCONTINUED | OUTPATIENT
Start: 2022-02-27 | End: 2022-03-01 | Stop reason: HOSPADM

## 2022-02-26 RX ADMIN — SODIUM CHLORIDE, PRESERVATIVE FREE 10 ML: 5 INJECTION INTRAVENOUS at 14:23

## 2022-02-26 RX ADMIN — ACETAMINOPHEN 325MG 650 MG: 325 TABLET ORAL at 10:23

## 2022-02-26 RX ADMIN — APIXABAN 5 MG: 5 TABLET, FILM COATED ORAL at 08:00

## 2022-02-26 RX ADMIN — CEFTRIAXONE SODIUM 1 G: 1 INJECTION, POWDER, FOR SOLUTION INTRAMUSCULAR; INTRAVENOUS at 14:23

## 2022-02-26 RX ADMIN — APIXABAN 5 MG: 5 TABLET, FILM COATED ORAL at 20:36

## 2022-02-26 RX ADMIN — METOPROLOL TARTRATE 5 MG: 5 INJECTION INTRAVENOUS at 14:27

## 2022-02-26 RX ADMIN — SODIUM CHLORIDE 50 ML/HR: 4.5 INJECTION, SOLUTION INTRAVENOUS at 12:03

## 2022-02-26 NOTE — PROGRESS NOTES
Hospitalist Progress Note    Subjective:   Daily Progress Note: 2/26/2022 12:06 PM    Hospital Course:  Nia Power is a 80 y.o. female with a past medical history of saddle PE and hypertension that presented to the emergency room on 2/25/2022 after a syncopal episode. Most of the information is gathered through daughter at bedside. Her daughter patient was sitting at the bedside commode and she noticed that her mother was not acting right. She then passed out. She said it was for about 15 to 20 minutes. Patient did not have any chest pain, shortness of breath, headache preceding the event. She did not fall or hit her head. Of note patient is on Eliquis 5 mg twice daily after being diagnosed with a saddle PE in 2017. In the ED vital signs showed hypotension with normal heart rate. Laboratory data was significant for a potassium of 3.4. Troponin x1 -. BNP 53. CT of the head showed no acute intracranial abnormality. EKG did not show ischemic changes. It was recommended admission overnight. Started on IV fluids. Subjective:    Patient seen and examined at bedside. Daughter at bedside. Patient is confused, not responding to questions appropriately. Daughter states this is not her baseline.      Current Facility-Administered Medications   Medication Dose Route Frequency    0.45% sodium chloride infusion  50 mL/hr IntraVENous CONTINUOUS    [START ON 2/27/2022] potassium chloride (K-DUR, KLOR-CON M20) SR tablet 40 mEq  40 mEq Oral DAILY    [START ON 2/27/2022] losartan (COZAAR) tablet 50 mg  50 mg Oral DAILY    sodium chloride (NS) flush 5-40 mL  5-40 mL IntraVENous PRN    acetaminophen (TYLENOL) tablet 650 mg  650 mg Oral Q6H PRN    Or    acetaminophen (TYLENOL) suppository 650 mg  650 mg Rectal Q6H PRN    polyethylene glycol (MIRALAX) packet 17 g  17 g Oral DAILY PRN    ondansetron (ZOFRAN ODT) tablet 4 mg  4 mg Oral Q8H PRN    Or    ondansetron (ZOFRAN) injection 4 mg  4 mg IntraVENous Q6H PRN    apixaban (ELIQUIS) tablet 5 mg  5 mg Oral BID    metoprolol (LOPRESSOR) injection 5 mg  5 mg IntraVENous Q6H PRN        Review of Systems  Constitutional: + generalized weakness, + near syncopal episodes, No fevers, No chills  Eyes: No redness  ENT: No nasal congestion, No sore throat, No voice change  Respiratory: No Shortness of Breath, No cough, No wheezing  Cardiovascular: No chest pain, No palpitations, No extremity edema  Gastrointestinal: No nausea, No vomiting, No diarrhea, No abdominal pain  Genitourinary: No frequency, No dysuria, No hematuria  Integument/breast: No skin lesion(s)   Neurological: No Confusion, No headaches, No dizziness      Objective:     Visit Vitals  BP (!) 158/83 (BP 1 Location: Right upper arm, BP Patient Position: Lying; At rest)   Pulse 81   Temp 97.8 °F (36.6 °C)   Resp 18   Ht 5' 8\" (1.727 m)   Wt 99.8 kg (220 lb)   SpO2 100%   BMI 33.45 kg/m²      O2 Device: None (Room air)    Temp (24hrs), Av.9 °F (36.6 °C), Min:97.8 °F (36.6 °C), Max:98.1 °F (36.7 °C)      No intake/output data recorded.  1901 -  0700  In: -   Out: 300 [Urine:300]    PHYSICAL EXAM:  Constitutional: No acute distress  Skin: Extremities and face reveal no rashes. HEENT: Sclerae anicteric. Extra-occular muscles are intact. No oral ulcers. The neck is supple and no masses. Cardiovascular: Regular rate and rhythm. Respiratory:  Clear breath sounds bilaterally with no wheezes, rales, or rhonchi. GI: Abdomen nondistended, soft, and nontender. Normal active bowel sounds. Rectal: Deferred   Musculoskeletal: No pitting edema of the lower legs. Able to move all ext  Neurological:  Patient is alert, disoriented. Generalized weakness (mostly bedbound).  Cranial nerves II-XII grossly intact  Psychiatric: Unable to assess       Data Review    Recent Results (from the past 24 hour(s))   ECHO ADULT COMPLETE    Collection Time: 22  4:25 PM   Result Value Ref Range    AV Mean Gradient 3 mmHg    AV VTI 16.2 cm    AV Mean Velocity 0.8 m/s    AV Peak Velocity 1.2 m/s    AV Peak Gradient 5 mmHg    AV Area by VTI 1.5 cm2    AV Area by Peak Velocity 1.5 cm2    Aortic Root 2.7 cm    Ascending Aorta 3.1 cm    IVSd 1.3 (A) 0.6 - 0.9 cm    LVIDd 2.2 (A) 3.9 - 5.3 cm    LVIDs 1.9 cm    LVOT Diameter 1.6 cm    LVOT Mean Gradient 2 mmHg    LVOT VTI 11.7 cm    LVOT Peak Velocity 0.9 m/s    LVOT Peak Gradient 3 mmHg    LVPWd 1.0 (A) 0.6 - 0.9 cm    LV E' Lateral Velocity 8 cm/s    LV E' Septal Velocity 11 cm/s    LVOT Area 2.0 cm2    LVOT SV 23.5 ml    LA Diameter 2.7 cm    MV A Velocity 1.06 m/s    MV E Velocity 0.50 m/s    TR Max Velocity 0.97 m/s    TR Peak Gradient 4 mmHg    Fractional Shortening 2D 14 28 - 44 %    LVIDd Index 1.03 cm/m2    LVIDs Index 0.89 cm/m2    LV RWT Ratio 0.91     LV Mass 2D 67.6 67 - 162 g    LV Mass 2D Index 31.7 (A) 43 - 95 g/m2    MV E/A 0.47     E/E' Ratio (Averaged) 5.40     E/E' Lateral 6.25     E/E' Septal 4.55     LVOT Stroke Volume Index 11.0 mL/m2    LA Size Index 1.27 cm/m2    LA/AO Root Ratio 1.00     Ao Root Index 1.27 cm/m2    Ascending Aorta Index 1.46 cm/m2    AV Velocity Ratio 0.75     LVOT:AV VTI Index 0.72     TAMRA/BSA VTI 0.7 cm2/m2    TAMRA/BSA Peak Velocity 0.7 cm2/m2    RV Basal Dimension 3.4 cm    RV Mid Dimension 1.8 cm   METABOLIC PANEL, BASIC    Collection Time: 02/26/22  8:03 AM   Result Value Ref Range    Sodium 146 (H) 136 - 145 mmol/L    Potassium 3.4 (L) 3.5 - 5.1 mmol/L    Chloride 115 (H) 97 - 108 mmol/L    CO2 21 21 - 32 mmol/L    Anion gap 10 5 - 15 mmol/L    Glucose 77 65 - 100 mg/dL    BUN 6 6 - 20 mg/dL    Creatinine 0.60 0.55 - 1.02 mg/dL    BUN/Creatinine ratio 10 (L) 12 - 20      GFR est AA >60 >60 ml/min/1.73m2    GFR est non-AA >60 >60 ml/min/1.73m2    Calcium 8.6 8.5 - 10.1 mg/dL   CBC WITH AUTOMATED DIFF    Collection Time: 02/26/22  8:03 AM   Result Value Ref Range    WBC 12.6 (H) 3.6 - 11.0 K/uL    RBC 5.05 3.80 - 5.20 M/uL    HGB 14.0 11.5 - 16.0 g/dL    HCT 45.0 35.0 - 47.0 %    MCV 89.1 80.0 - 99.0 FL    MCH 27.7 26.0 - 34.0 PG    MCHC 31.1 30.0 - 36.5 g/dL    RDW 15.8 (H) 11.5 - 14.5 %    PLATELET 361 289 - 501 K/uL    NRBC 0.0 0.0  WBC    ABSOLUTE NRBC 0.00 0.00 - 0.01 K/uL    DF Manual      NEUTROPHILS 78 (H) 32 - 75 %    LYMPHOCYTES 17 12 - 49 %    MONOCYTES 5 5 - 13 %    EOSINOPHILS 0 0 - 7 %    BASOPHILS 0 0 - 1 %    IMMATURE GRANULOCYTES 0 %    ABS. NEUTROPHILS 9.9 (H) 1.8 - 8.0 K/UL    ABS. LYMPHOCYTES 2.1 0.8 - 3.5 K/UL    ABS. MONOCYTES 0.6 0.0 - 1.0 K/UL    ABS. EOSINOPHILS 0.0 0.0 - 0.4 K/UL    ABS. BASOPHILS 0.0 0.0 - 0.1 K/UL    ABS. IMM. GRANS. 0.0 K/UL    PLATELET COMMENTS Large Platelets      RBC COMMENTS Normocytic, Normochromic     D DIMER    Collection Time: 02/26/22  8:03 AM   Result Value Ref Range    D DIMER 0.36 <0.50 ug/ml(FEU)       CT HEAD WO CONT   Final Result   Age-appropriate atrophy. No acute findings. Active Problems:    Syncope (2/25/2022)        Assessment/Plan:     1. Syncopal episode - most likely vasovagal  - CT of the head negative  - ECHO: LVEF 60-65%  - Cardiac telemetry  - Check orthostatics  - Continue with IV hydration    2. AMS  - Will check urinalysis and reflex cx  - Hx of recurrent UTIs  - Will prophylactically start on IV ceftriaxone     3. Hypertension  - Initially hypotensive, now improving  - Resume home losartan    4. History of saddle PE  - Continue home Eliquis     5. Hypokalemia - po repletion      DVT Prophylaxis: Eliquis  Code Status: Full  POA:    Care Plan discussed with: patient, nursing, and daughter at bedside    Total time spent with patient: >35 minutes.

## 2022-02-26 NOTE — PROGRESS NOTES
Reason for Admission:  Syncope                     RUR Score: N/A                    Plan for utilizing home health:   Decline by daughter       PCP: First and Last name:  Aldo Basilio MD     Name of Practice:    Are you a current patient: Yes/No:    Approximate date of last visit:    Can you participate in a virtual visit with your PCP:                     Current Advanced Directive/Advance Care Plan: Full Code      Healthcare Decision Maker:   Click here to complete [id number] including selection of the Healthcare Decision Maker Relationship (ie \"Primary\")           Kathy Robert  Daughter  [phone]                  Transition of Care Plan:                    CM discussed discharge planning with patient. Patient unable to communicate. Daughter states patient lives in two level home with her children. She has 4-5 steps to enter. Patient is bed bound with AMS. Children provide her ADL/IADL care. Patient require Maximum assistance with ADL/IADL care. Medications crushed in applesauce. MD appt virtual. Daughter looking into visiting nurses for upcoming MD appts due to difficulty with transportation. Pharmacy: [address] [place] [address] [phone].

## 2022-02-26 NOTE — PROGRESS NOTES
PHYSICAL THERAPY EVALUATION  Patient: Maggie Lerma (80 y.o. female)  Date: 2/26/2022  Primary Diagnosis: Syncope [R55]        Precautions: falls       ASSESSMENT  Pt is an 79 yo female admitted on 2/25/2022 after passing out on [facility] with daughter present; pt currently being treated for syncope, HTN with hypotensive episode, h/o PE, and hypokalmeia. PMH: sadlle PE, HTN, dementia, low vision/glaucoma, DDD, obesity, h/o back surgery. Pt semi-supine in bed with daughter upon PT arrival, agreeable to evaluation. Pt A&O x 0, when asked name mumbled response and did not answer question; pt with incoherent mumbling throughout session, Daughter reports that this a normal occurences on pts \"bad days\". Per daughter pt resides with daughters in a 2 story home, pt was dependent for ADLS/IADLS, WC with mobility prior to admission. DME: BSC and WC. Daughter reports pt is unable to complete stairs outside home and in the home and uses ambulance transport to reach primary bedroom, pt has not left her home in 2 years due to [place]. Based on the objective data described below, the patient presents with generalized weakness, impaired functional mobility, impaired balance, and decreased activity tolerance with resistance to mobility this session. Pt required max to total A for bed mobility to place chux pad under pt. Daughter requested we attempt supine <> sit transfers, however, pt extremely resistant to EOB mobility this session and pushed PT as well as daughter away from her. Demonstrates weakness in bilateral LE with assessment performed bed level. Pt did poor with session today with limited mobility this session with increased confusion and limited ability to redirect pt throughout session. Extensive family education regarding role and expectations of PT given to daughter present in room, daughter is requesting [facility] therapy services. Pt will benefit from continued skilled PT to addrss above deficits and return to PLOF. Current PT DC recommendation HHPT for caregiver education and transfer training as needed, limited rehab potential due to advance dementia. Current Level of Function Impacting Discharge (mobility/balance): max to total A    Other factors to consider for discharge: close to baseline, advanced dementia, good family support       PLAN :  Recommendations and Planned Interventions: bed mobility training, transfer training, therapeutic exercises, neuromuscular re-education, patient and family training/education and therapeutic activities      Frequency/Duration: Patient will be followed by physical therapy:  1-2x/week to address goals. Recommendation for discharge: (in order for the patient to meet his/her long term goals)  Home with [address] [place] [address]    This discharge recommendation:  Has been made in collaboration with the attending provider and/or case management    IF patient discharges home will need the following DME: to be determined (TBD)         SUBJECTIVE:   Patient stated stop it!  when attempting EOB transfers    OBJECTIVE DATA SUMMARY:   HISTORY:    No past medical history on file. No past surgical history on file. Home Situation  Home Environment: Private residence  One/Two Story Residence: Two story  Lift Chair Available: No  Living Alone: No  Support Systems: Child(blank)  Current DME Used/Available at Home: Wheelchair,Commode, bedside    EXAMINATION/PRESENTATION/DECISION MAKING:   Critical Behavior:  Neurologic State: Confused,Agitated  Orientation Level: Disoriented X4  Cognition: Decreased attention/concentration,Decreased command following     Hearing:     Skin:  Intact where visible  Edema: none noted   Range Of Motion:  AROM: Grossly decreased, non-functional           PROM: Grossly decreased, non-functional           Strength:    Strength: Grossly decreased, non-functional           Functional Mobility:  Bed Mobility:  Rolling: Maximum assistance; Total assistance  Supine to Sit: Maximum assistance; Total assistance     Scooting: Maximum assistance; Total assistance  Transfers:   not tested this session     Balance:    not tested this session    Ambulation/Gait Training:   NA    Therapeutic Exercises:   Not completed this session    Functional Measure:  [employer] [facility] Mobility Inpatient Short Form  How much difficulty does the patient currently have. .. Unable A Lot A Little None   1. Turning over in bed (including adjusting bedclothes, sheets and blankets)? [] 1   [x] 2   [] 3   [] 4   2. Sitting down on and standing up from a chair with arms ( e.g., wheelchair, bedside commode, etc.)   [x] 1   [] 2   [] 3   [] 4   3. Moving from lying on back to sitting on the side of the bed? [] 1   [x] 2   [] 3   [] 4          How much help from another person does the patient currently need. .. Total A Lot A Little None   4. Moving to and from a bed to a chair (including a wheelchair)? [x] 1   [] 2   [] 3   [] 4   5. Need to walk in hospital room? [x] 1   [] 2   [] 3   [] 4   6. Climbing 3-5 steps with a railing? [x] 1   [] 2   [] 3   [] 4   © 2007, Trustees of [facility] [employer], under license to [employer]. All rights reserved     Score:  Initial: 8/24 Most Recent: X (Date: 2/26/22 )   Interpretation of Tool:  Represents activities that are increasingly more difficult (i.e. Bed mobility, Transfers, Gait).   Score 24 23 22-20 19-15 14-10 9-7 6   Modifier CH CI CJ CK CL CM CN         Physical Therapy Evaluation Charge Determination   History Examination Presentation Decision-Making   HIGH Complexity :3+ comorbidities / personal factors will impact the outcome/ POC  HIGH Complexity : 4+ Standardized tests and measures addressing body structure, function, activity limitation and / or participation in recreation  MEDIUM Complexity : Evolving with changing characteristics  Other outcome measures ampac 6  mod      Based on the above components, the patient evaluation is determined to be of the following complexity level: MEDIUM    Pain Ratin/10 reported, negative grimace sign noted     Activity Tolerance:   Poor and limited participation this session due to confusion    After treatment patient left in no apparent distress:   Supine in bed, Call bell within reach, Bed / chair alarm activated, Caregiver / family present, and Side rails x 3 and nsg updated. GOALS:    Problem: Mobility Impaired (Adult and Pediatric)  Goal: *Acute Goals and Plan of Care (Insert Text)  Description: Pt will be I with LE HEP in 7 days. Pt will perform bed mobility with min Ax1 in 7 days. Pt will perform stand pivot transfers with min Ax1 in 7 days. Pt will demonstrate seated dynamic balance with SBA x 5-10 min to allow for LE therex and to allow for caregivers to complete self care in 7 days. Outcome: Not Met       COMMUNICATION/EDUCATION:   The patients plan of care was discussed with: Occupational therapist, Registered nurse, and Case management. Fall prevention education was provided and the patient/caregiver indicated understanding., Patient/family have participated as able in goal setting and plan of care. , and Patient/family agree to work toward stated goals and plan of care.      Thank you for this referral.  Reyes Countryman, PT, DPT   Time Calculation: 27 mins

## 2022-02-27 PROBLEM — R55 NEAR SYNCOPE: Status: ACTIVE | Noted: 2022-02-27

## 2022-02-27 LAB
ALBUMIN SERPL-MCNC: 2.4 G/DL (ref 3.5–5)
ALBUMIN/GLOB SERPL: 0.7 {RATIO} (ref 1.1–2.2)
ALP SERPL-CCNC: 77 U/L (ref 45–117)
ALT SERPL-CCNC: 22 U/L (ref 12–78)
AMMONIA PLAS-SCNC: 22 UMOL/L
APPEARANCE UR: ABNORMAL
AST SERPL W P-5'-P-CCNC: 27 U/L (ref 15–37)
BACTERIA URNS QL MICRO: NEGATIVE /HPF
BILIRUB DIRECT SERPL-MCNC: 0.3 MG/DL (ref 0–0.2)
BILIRUB SERPL-MCNC: 0.7 MG/DL (ref 0.2–1)
BILIRUB UR QL: NEGATIVE
COLOR UR: ABNORMAL
GLOBULIN SER CALC-MCNC: 3.4 G/DL (ref 2–4)
GLUCOSE UR STRIP.AUTO-MCNC: NEGATIVE MG/DL
HGB UR QL STRIP: NEGATIVE
KETONES UR QL STRIP.AUTO: 5 MG/DL
LEUKOCYTE ESTERASE UR QL STRIP.AUTO: NEGATIVE
MUCOUS THREADS URNS QL MICRO: ABNORMAL /LPF
NITRITE UR QL STRIP.AUTO: NEGATIVE
PH UR STRIP: 6 [PH] (ref 5–8)
PROT SERPL-MCNC: 5.8 G/DL (ref 6.4–8.2)
PROT UR STRIP-MCNC: NEGATIVE MG/DL
RBC #/AREA URNS HPF: ABNORMAL /HPF (ref 0–5)
SP GR UR REFRACTOMETRY: 1.01 (ref 1–1.03)
UA: UC IF INDICATED,UAUC: ABNORMAL
UROBILINOGEN UR QL STRIP.AUTO: 0.1 EU/DL (ref 0.1–1)
WBC URNS QL MICRO: ABNORMAL /HPF (ref 0–4)
YEAST URNS QL MICRO: PRESENT

## 2022-02-27 PROCEDURE — 74011250637 HC RX REV CODE- 250/637: Performed by: STUDENT IN AN ORGANIZED HEALTH CARE EDUCATION/TRAINING PROGRAM

## 2022-02-27 PROCEDURE — 74011250637 HC RX REV CODE- 250/637: Performed by: PHYSICIAN ASSISTANT

## 2022-02-27 PROCEDURE — G0378 HOSPITAL OBSERVATION PER HR: HCPCS

## 2022-02-27 PROCEDURE — 81001 URINALYSIS AUTO W/SCOPE: CPT

## 2022-02-27 PROCEDURE — 87040 BLOOD CULTURE FOR BACTERIA: CPT

## 2022-02-27 PROCEDURE — 97530 THERAPEUTIC ACTIVITIES: CPT

## 2022-02-27 PROCEDURE — 97165 OT EVAL LOW COMPLEX 30 MIN: CPT

## 2022-02-27 PROCEDURE — 74011250636 HC RX REV CODE- 250/636: Performed by: STUDENT IN AN ORGANIZED HEALTH CARE EDUCATION/TRAINING PROGRAM

## 2022-02-27 PROCEDURE — 82140 ASSAY OF AMMONIA: CPT

## 2022-02-27 PROCEDURE — 80076 HEPATIC FUNCTION PANEL: CPT

## 2022-02-27 PROCEDURE — 65270000029 HC RM PRIVATE

## 2022-02-27 PROCEDURE — 74011000250 HC RX REV CODE- 250: Performed by: STUDENT IN AN ORGANIZED HEALTH CARE EDUCATION/TRAINING PROGRAM

## 2022-02-27 RX ADMIN — APIXABAN 5 MG: 5 TABLET, FILM COATED ORAL at 21:04

## 2022-02-27 RX ADMIN — ACETAMINOPHEN 325MG 650 MG: 325 TABLET ORAL at 20:21

## 2022-02-27 RX ADMIN — LOSARTAN POTASSIUM 50 MG: 50 TABLET, FILM COATED ORAL at 09:01

## 2022-02-27 RX ADMIN — CEFTRIAXONE SODIUM 1 G: 1 INJECTION, POWDER, FOR SOLUTION INTRAMUSCULAR; INTRAVENOUS at 14:08

## 2022-02-27 RX ADMIN — POTASSIUM CHLORIDE 40 MEQ: 1500 TABLET, EXTENDED RELEASE ORAL at 09:00

## 2022-02-27 RX ADMIN — ACETAMINOPHEN 325MG 650 MG: 325 TABLET ORAL at 14:03

## 2022-02-27 RX ADMIN — APIXABAN 5 MG: 5 TABLET, FILM COATED ORAL at 09:00

## 2022-02-27 NOTE — PROGRESS NOTES
Pt turned to right side. Has remained continent throughout this shift so far. Checking q 1 hour so pt will not lay in wetness. Pad dry. Back and buttocks care given. Pinkish, but not broken skin. No rash noted on this inspection. Mariola Ramirez is hooked to suction and is working well. Pt seems to tolerate this although she is confused and agitated at times with staff and her daughter.

## 2022-02-27 NOTE — PROGRESS NOTES
Problem: Self Care Deficits Care Plan (Adult)  Goal: *Acute Goals and Plan of Care (Insert Text)  Description: Pt will be moderate assistance  self feeding  Pt will be moderate assistance  sup<->sit in prep for EOB ADL's  Pt will be moderate assistance   LB dressing EOB level  Pt will be minimal assistance/contact guard assist  sit EOB 10 minutes in prep for EOB ADL's  Pt will be supervision/set-up  grooming EOB level  Pt will be moderate assistance   sit<-> prep for toilet transfer  Pt will be moderate assistance   BSC/toilet transfer with LRAD  Pt will be moderate assistance   toileting/cloth mgmt LRAD  Pt will be moderate assistance   grooming standing sink  Pt will be moderate assistance  bathing sitting/standing sink LRAD  Pt will be MI malu UE HEP in prep for self care tasks    Outcome: Not Met     OCCUPATIONAL THERAPY EVALUATION  Patient: Cha Quiñonez [de-identified]80 y.o. female)  Date: 2/27/2022  Primary Diagnosis: Syncope [R55]  AMS (altered mental status) [R41.82]  Near syncope [R55]        Precautions:  Fall, dementia,  Low vision    ASSESSMENT  Pt is an 79 yo female admitted on 2/25/2022 after passing out on [facility] with daughter present; pt currently being treated for syncope, HTN with hypotensive episode, h/o PE, and hypokalmeia. Head CT with no acute findings.      PMH: saddle PE, HTN, dementia, low vision/glaucoma, DDD, obesity, h/o back surgery. Pt semi-supine in bed with daughter upon OT arrival, agreeable to evaluation. Pt A&O x1 person only. Pt found to be mumbling throughout entirety of evaluation, which per daughter is a regular occurrence. She only opens eyes spontaneously and frequently makes a pouting face, almost tearful in nature, though does not seem to be otherwise upset or in pain. Per daughter pt resides with daughters in a 2 story home, pt was max assist/dependent for ADLS/IADLS, WC with mobility prior to admission. DME: BSC and WC.  Daughter reports pt is unable to complete stairs outside home and in the home and uses ambulance transport to reach primary bedroom, pt has not left her home in 2 years due to [place]. Family recently purchased recliner chair with mechanical lift to assist in transfers at home. Pt currently presents with deficits in strength, AROM, tone, coordination, decreased command following, impaired cognition, decreased activity tolerance, and increased need for assist with self care tasks and functional mobility/transfers. OOB activity and transfers were not assessed today 2/2 pt lethargy, command following, and concern for safety. Simulated ADLs in semi supine in bed with daughter present. Pt requires min assist for UB dressing, total assist LB dressing, setup and min assist simple grooming tasks in bed, dependent for toileting/hygiene, and bathing tasks. Pt is max/total for all mobility/transfers due to decreased balance, alertness, and coordination. Minimal AROM noted during evaluation, which was spontaneous, due to no command following. Tolerated minimal PROM to BUE. LUE noted to be held in guarded position, significant time spent ranging LUE and educating pt/family on joint mobility and integrity. Pt would benefit from skilled OT services while at [facility] in order to increase safety and independence with self care and functional transfers/mobility. Recommend discharge to [address] [place] [address] and 24/7 family supervision/assist when medically appropriate. Family would benefit from caregiver education/relief due to potential for burnout. Other factors to consider for discharge: family support, DME, severity of deficits, age, time since onset        PLAN :  Recommendations and Planned Interventions: self care training, functional mobility training, therapeutic exercise, therapeutic activities and family training/education    Frequency/Duration: Patient will be followed by occupational therapy 1-2x/week to address goals.     Recommendation for discharge: (in order for the patient to meet his/her long term goals)  Home with [address] [facility] [place]    This discharge recommendation:  Has not yet been discussed the attending provider and/or case management    IF patient discharges home will need the following DME: TBD       SUBJECTIVE:   Patient stated shhhhhh you shut your mouth.     OBJECTIVE DATA SUMMARY:   HISTORY:   No past medical history on file. No past surgical history on file. Expanded or extensive additional review of patient history:     Home Situation  Home Environment: Private residence  Wheelchair Ramp: No  One/Two Story Residence: One story  Lift Chair Available: No  Living Alone: No  Support Systems: Child(blank)  Patient Expects to be Discharged to[de-identified] Home with home health  Current DME Used/Available at Home: Commode, bedside,Wheelchair,Other (comment) (power recliner lift chair)    PLOF: Pt max/total for ADLS/IADLS, total with mobility prior to admission. Hand dominance: Right    EXAMINATION OF PERFORMANCE DEFICITS:  Cognitive/Behavioral Status:  Neurologic State: Agitated;Confused; Eyes open spontaneously;Irritable  Orientation Level: Oriented to person  Cognition: Decreased attention/concentration;Decreased command following; Impaired decision making;Memory loss; No command following;Poor safety awareness  Safety/Judgement: Decreased awareness of environment;Decreased awareness of need for assistance;Decreased awareness of need for safety;Decreased insight into deficits; Lack of insight into deficits    Hearing: Auditory  Auditory Impairment: None    Range of Motion:  AROM: Generally decreased, functional  PROM: Generally decreased, functional    Strength:  Strength: Generally decreased, functional    Coordination:  Coordination: Generally decreased, functional    ADL Assessment:  Feeding: Maximum assistance    Oral Facial Hygiene/Grooming: Minimum assistance    Upper Body Dressing: Minimum assistance    Lower Body Dressing: Total assistance    Toileting:  Total assistance    ADL Intervention and task modifications:  Feeding  Feeding Assistance: Maximum assistance  Container Management: Total assistance (dependent)  Cutting Food: Total assistance (dependent)  Utensil Management: Total assistance (dependent)  Food to Mouth: Maximum assistance  Drink to Mouth: Maximum assistance    Grooming  Grooming Assistance: Minimum assistance  Position Performed: Other (comment) (semi supine)  Washing Face: Set-up; Minimum assistance    Upper Body Bathing  Bathing Assistance: Minimum assistance  Position Performed: Other (comment) (semi supine)    Lower Body Bathing  Bathing Assistance: Total assistance(dependent)    Upper Body [address]: Minimum  assistance    Lower Body Dressing Assistance  Socks: Total assistance (dependent)  Leg Crossed Method Used: No  Position Performed: Supine    Toileting  Toileting Assistance: Total assistance(dependent)  Bladder Hygiene: Total assistance (dependent)  Bowel Hygiene: Total assistance (dependent)  Clothing Management: Total assistance (dependent)    Cognitive Retraining  Orientation Retraining: Awareness of environment;Place  Safety/Judgement: Decreased awareness of environment;Decreased awareness of need for assistance;Decreased awareness of need for safety;Decreased insight into deficits; Lack of insight into deficits    Therapeutic Exercise:  PROM / AROM BUE X10 reps all planes    [facility] AM-[facility]CT[facility] \"6 Clicks\"                                                       Daily Activity Inpatient Short Form  How much help from another person does the patient currently need. .. Total; A Lot A Little None   1. Putting on and taking off regular lower body clothing? [x]  1 []  2 []  3 []  4   2. Bathing (including washing, rinsing, drying)? [x]  1 []  2 []  3 []  4   3. Toileting, which includes using toilet, bedpan or urinal? [x] 1 []  2 []  3 []  4   4. Putting on and taking off regular upper body clothing?  []  1 []  2 [x]  3 []  4   5. Taking care of personal grooming such as brushing teeth? []  1 []  2 [x]  3 []  4   6. Eating meals? []  1 [x]  2 []  3 []  4   © 2007, Trustees of [address] [place] [address] [address] [address], under license to [employer]. All rights reserved     Score: 11/24     Interpretation of Tool:  Represents clinically-significant functional categories (i.e. Activities of daily living). Percentage of Impairment CH    0%   CI    1-19% CJ    20-39% CK    40-59% CL    60-79% CM    80-99% CN     100%   [facility]  Score 6-24 24 23 20-22 15-19 10-14 7-9 6        Occupational Therapy Evaluation Charge Determination   History Examination Decision-Making   LOW Complexity : Brief history review  MEDIUM Complexity : 3-5 performance deficits relating to physical, cognitive , or psychosocial skils that result in activity limitations and / or participation restrictions MEDIUM Complexity : Patient may present with comorbidities that affect occupational performnce. Miniml to moderate modification of tasks or assistance (eg, physical or verbal ) with assesment(s) is necessary to enable patient to complete evaluation       Based on the above components, the patient evaluation is determined to be of the following complexity level: LOW   Pain Rating:  No pain noted or quantified    Activity Tolerance:   Poor  Please refer to the flowsheet for vital signs taken during this treatment. After treatment patient left in no apparent distress:    Supine in bed, Call bell within reach and Caregiver / family present    COMMUNICATION/EDUCATION:   The patients plan of care was discussed with: Registered nurse. Patient/family have participated as able in goal setting and plan of care. This patients plan of care is appropriate for delegation to [facility].     Thank you for this referral.  Helder Veloz, ELISA  Time Calculation: 49 mins

## 2022-02-27 NOTE — PROGRESS NOTES
Hospitalist Progress Note    Subjective:   Daily Progress Note: 2/27/2022 12:06 PM    Hospital Course:  Galina Stewart is a 80 y.o. female with a past medical history of saddle PE and hypertension that presented to the emergency room on 2/25/2022 after a syncopal episode. Most of the information is gathered through daughter at bedside. Her daughter patient was sitting at the bedside commode and she noticed that her mother was not acting right. She then passed out. She said it was for about 15 to 20 minutes. Patient did not have any chest pain, shortness of breath, headache preceding the event. She did not fall or hit her head. Of note patient is on Eliquis 5 mg twice daily after being diagnosed with a saddle PE in 2017. In the ED vital signs showed hypotension with normal heart rate. Laboratory data was significant for a potassium of 3.4. Troponin x1 -. BNP 53. CT of the head showed no acute intracranial abnormality. EKG did not show ischemic changes. It was recommended admission overnight. Started on IV fluids. Stat urinalysis was placed, which was not done by nursing. Subjective:    Patient seen and examined at bedside. Patient still confused. No overnight events.        Current Facility-Administered Medications   Medication Dose Route Frequency    0.45% sodium chloride infusion  50 mL/hr IntraVENous CONTINUOUS    potassium chloride (K-DUR, KLOR-CON M20) SR tablet 40 mEq  40 mEq Oral DAILY    losartan (COZAAR) tablet 50 mg  50 mg Oral DAILY    cefTRIAXone (ROCEPHIN) 1 g in sterile water (preservative free) 10 mL IV syringe  1 g IntraVENous Q24H    rivastigmine (EXELON) 4.6 mg/24 hour patch 1 Patch  1 Patch TransDERmal DAILY    sodium chloride (NS) flush 5-40 mL  5-40 mL IntraVENous PRN    acetaminophen (TYLENOL) tablet 650 mg  650 mg Oral Q6H PRN    Or    acetaminophen (TYLENOL) suppository 650 mg  650 mg Rectal Q6H PRN    polyethylene glycol (MIRALAX) packet 17 g  17 g Oral DAILY PRN  ondansetron (ZOFRAN ODT) tablet 4 mg  4 mg Oral Q8H PRN    Or    ondansetron (ZOFRAN) injection 4 mg  4 mg IntraVENous Q6H PRN    apixaban (ELIQUIS) tablet 5 mg  5 mg Oral BID    metoprolol (LOPRESSOR) injection 5 mg  5 mg IntraVENous Q6H PRN        Review of Systems  Constitutional: + generalized weakness, + near syncopal episodes, No fevers, No chills  Eyes: No redness  ENT: No nasal congestion, No sore throat, No voice change  Respiratory: No Shortness of Breath, No cough, No wheezing  Cardiovascular: No chest pain, No palpitations, No extremity edema  Gastrointestinal: No nausea, No vomiting, No diarrhea, No abdominal pain  Genitourinary: No frequency, No dysuria, No hematuria  Integument/breast: No skin lesion(s)   Neurological: No Confusion, No headaches, No dizziness      Objective:     Visit Vitals  /86   Pulse 80   Temp 97.6 °F (36.4 °C)   Resp 18   Ht 5' 8\" (1.727 m)   Wt 99.8 kg (220 lb)   SpO2 97%   BMI 33.45 kg/m²      O2 Device: None (Room air)    Temp (24hrs), Av °F (36.7 °C), Min:97.6 °F (36.4 °C), Max:98.5 °F (36.9 °C)      No intake/output data recorded.  1901 -  0700  In: -   Out: 600 [Urine:300]    PHYSICAL EXAM:  Constitutional: No acute distress  Skin: Extremities and face reveal no rashes. HEENT: Sclerae anicteric. Extra-occular muscles are intact. No oral ulcers. The neck is supple and no masses. Cardiovascular: Regular rate and rhythm. Respiratory:  Clear breath sounds bilaterally with no wheezes, rales, or rhonchi. GI: Abdomen nondistended, soft, and nontender. Normal active bowel sounds. Rectal: Deferred   Musculoskeletal: No pitting edema of the lower legs. Able to move all ext  Neurological:  Patient is alert, disoriented. Generalized weakness (mostly bedbound). Cranial nerves II-XII grossly intact  Psychiatric: Unable to assess       Data Review    No results found for this or any previous visit (from the past 24 hour(s)).     CT HEAD WO CONT   Final Result   Age-appropriate atrophy. No acute findings. Active Problems:    Syncope (2/25/2022)        Assessment/Plan:     1. Syncopal episode - most likely vasovagal  - CT of the head negative  - ECHO: LVEF 60-65%  - Cardiac telemetry  - Check orthostatics  - Continue with IV hydration    2. AMS  - Will check urinalysis and reflex cx  - Hx of recurrent UTIs  - Prophylactically started on IV ceftriaxone   - STAT urinalysis was ordered yesterday. Asked nurse to collect urine sample yesterday, was not done. Asked nursing to collect urine sample this morning, again. 3. Hypertension  - Initially hypotensive, now improving  - Resume home losartan    4. History of saddle PE  - Continue home Eliquis     5. Hypokalemia - po repletion      DVT Prophylaxis: Eliquis  Code Status: Full  POA:    Care Plan discussed with: patient, nursing, and daughter at bedside    Total time spent with patient: >35 minutes.

## 2022-02-28 ENCOUNTER — APPOINTMENT (OUTPATIENT)
Dept: GENERAL RADIOLOGY | Age: 87
DRG: 312 | End: 2022-02-28
Attending: STUDENT IN AN ORGANIZED HEALTH CARE EDUCATION/TRAINING PROGRAM
Payer: MEDICARE

## 2022-02-28 LAB
ANION GAP SERPL CALC-SCNC: 8 MMOL/L (ref 5–15)
BUN SERPL-MCNC: 3 MG/DL (ref 6–20)
BUN/CREAT SERPL: 7 (ref 12–20)
CA-I BLD-MCNC: 7.8 MG/DL (ref 8.5–10.1)
CHLORIDE SERPL-SCNC: 103 MMOL/L (ref 97–108)
CO2 SERPL-SCNC: 27 MMOL/L (ref 21–32)
CREAT SERPL-MCNC: 0.43 MG/DL (ref 0.55–1.02)
ERYTHROCYTE [DISTWIDTH] IN BLOOD BY AUTOMATED COUNT: 15 % (ref 11.5–14.5)
GLUCOSE SERPL-MCNC: 107 MG/DL (ref 65–100)
HCT VFR BLD AUTO: 39.9 % (ref 35–47)
HGB BLD-MCNC: 12.8 G/DL (ref 11.5–16)
MCH RBC QN AUTO: 27.2 PG (ref 26–34)
MCHC RBC AUTO-ENTMCNC: 32.1 G/DL (ref 30–36.5)
MCV RBC AUTO: 84.7 FL (ref 80–99)
NRBC # BLD: 0 K/UL (ref 0–0.01)
NRBC BLD-RTO: 0 PER 100 WBC
PLATELET # BLD AUTO: 262 K/UL (ref 150–400)
POTASSIUM SERPL-SCNC: 2.6 MMOL/L (ref 3.5–5.1)
RBC # BLD AUTO: 4.71 M/UL (ref 3.8–5.2)
SODIUM SERPL-SCNC: 138 MMOL/L (ref 136–145)
WBC # BLD AUTO: 7.8 K/UL (ref 3.6–11)

## 2022-02-28 PROCEDURE — 74011250637 HC RX REV CODE- 250/637: Performed by: STUDENT IN AN ORGANIZED HEALTH CARE EDUCATION/TRAINING PROGRAM

## 2022-02-28 PROCEDURE — 80048 BASIC METABOLIC PNL TOTAL CA: CPT

## 2022-02-28 PROCEDURE — 36415 COLL VENOUS BLD VENIPUNCTURE: CPT

## 2022-02-28 PROCEDURE — 85027 COMPLETE CBC AUTOMATED: CPT

## 2022-02-28 PROCEDURE — 65270000029 HC RM PRIVATE

## 2022-02-28 PROCEDURE — 74011250636 HC RX REV CODE- 250/636: Performed by: STUDENT IN AN ORGANIZED HEALTH CARE EDUCATION/TRAINING PROGRAM

## 2022-02-28 PROCEDURE — 74011000250 HC RX REV CODE- 250: Performed by: STUDENT IN AN ORGANIZED HEALTH CARE EDUCATION/TRAINING PROGRAM

## 2022-02-28 PROCEDURE — 74011250637 HC RX REV CODE- 250/637: Performed by: PHYSICIAN ASSISTANT

## 2022-02-28 PROCEDURE — 73630 X-RAY EXAM OF FOOT: CPT

## 2022-02-28 RX ORDER — POTASSIUM CHLORIDE 7.45 MG/ML
10 INJECTION INTRAVENOUS
Status: DISPENSED | OUTPATIENT
Start: 2022-02-28 | End: 2022-02-28

## 2022-02-28 RX ORDER — POTASSIUM CHLORIDE 7.45 MG/ML
10 INJECTION INTRAVENOUS ONCE
Status: COMPLETED | OUTPATIENT
Start: 2022-02-28 | End: 2022-02-28

## 2022-02-28 RX ORDER — METOPROLOL SUCCINATE 50 MG/1
25 TABLET, EXTENDED RELEASE ORAL DAILY
Status: DISCONTINUED | OUTPATIENT
Start: 2022-03-01 | End: 2022-03-01 | Stop reason: HOSPADM

## 2022-02-28 RX ADMIN — ACETAMINOPHEN 325MG 650 MG: 325 TABLET ORAL at 21:03

## 2022-02-28 RX ADMIN — APIXABAN 5 MG: 5 TABLET, FILM COATED ORAL at 21:03

## 2022-02-28 RX ADMIN — POTASSIUM CHLORIDE 10 MEQ: 7.46 INJECTION, SOLUTION INTRAVENOUS at 15:37

## 2022-02-28 RX ADMIN — POTASSIUM CHLORIDE 10 MEQ: 7.46 INJECTION, SOLUTION INTRAVENOUS at 14:10

## 2022-02-28 RX ADMIN — POTASSIUM CHLORIDE 40 MEQ: 1500 TABLET, EXTENDED RELEASE ORAL at 10:15

## 2022-02-28 RX ADMIN — POTASSIUM CHLORIDE 10 MEQ: 7.46 INJECTION, SOLUTION INTRAVENOUS at 13:06

## 2022-02-28 RX ADMIN — SODIUM CHLORIDE 50 ML/HR: 4.5 INJECTION, SOLUTION INTRAVENOUS at 05:09

## 2022-02-28 RX ADMIN — POTASSIUM CHLORIDE 10 MEQ: 7.46 INJECTION, SOLUTION INTRAVENOUS at 12:08

## 2022-02-28 RX ADMIN — LOSARTAN POTASSIUM 50 MG: 50 TABLET, FILM COATED ORAL at 10:15

## 2022-02-28 RX ADMIN — APIXABAN 5 MG: 5 TABLET, FILM COATED ORAL at 10:15

## 2022-02-28 RX ADMIN — CEFTRIAXONE SODIUM 1 G: 1 INJECTION, POWDER, FOR SOLUTION INTRAMUSCULAR; INTRAVENOUS at 13:07

## 2022-02-28 NOTE — PROGRESS NOTES
Hospitalist Progress Note    Subjective:   Daily Progress Note: 2022 10:25 AM    Patient is not very talkative. Most of the history was obtained from daughter at bedside. Patient daughter says that she is a bit more talkative and alert but she does have a baseline deterioration. Patient has noticeably pain when palpating lateral aspect of left ankle. Current Facility-Administered Medications   Medication Dose Route Frequency    0.45% sodium chloride infusion  50 mL/hr IntraVENous CONTINUOUS    potassium chloride (K-DUR, KLOR-CON M20) SR tablet 40 mEq  40 mEq Oral DAILY    losartan (COZAAR) tablet 50 mg  50 mg Oral DAILY    cefTRIAXone (ROCEPHIN) 1 g in sterile water (preservative free) 10 mL IV syringe  1 g IntraVENous Q24H    rivastigmine (EXELON) 4.6 mg/24 hour patch 1 Patch  1 Patch TransDERmal DAILY    sodium chloride (NS) flush 5-40 mL  5-40 mL IntraVENous PRN    acetaminophen (TYLENOL) tablet 650 mg  650 mg Oral Q6H PRN    Or    acetaminophen (TYLENOL) suppository 650 mg  650 mg Rectal Q6H PRN    polyethylene glycol (MIRALAX) packet 17 g  17 g Oral DAILY PRN    ondansetron (ZOFRAN ODT) tablet 4 mg  4 mg Oral Q8H PRN    Or    ondansetron (ZOFRAN) injection 4 mg  4 mg IntraVENous Q6H PRN    apixaban (ELIQUIS) tablet 5 mg  5 mg Oral BID    metoprolol (LOPRESSOR) injection 5 mg  5 mg IntraVENous Q6H PRN        Review of Systems  Review of Systems   Unable to perform ROS: Acuity of condition            Objective:     Visit Vitals  BP (!) 180/88   Pulse 91   Temp 98 °F (36.7 °C)   Resp 16   Ht 5' 8\" (1.727 m)   Wt 99.8 kg (220 lb)   SpO2 95%   BMI 33.45 kg/m²      O2 Device: None (Room air)    Temp (24hrs), Av °F (36.7 °C), Min:97.9 °F (36.6 °C), Max:98 °F (36.7 °C)      No intake/output data recorded.    1901 -  0700  In: -   Out: 800 [Urine:500]    Recent Results (from the past 24 hour(s))   URINALYSIS W/ REFLEX CULTURE    Collection Time: 22  1:30 PM    Specimen: Urine Result Value Ref Range    Color Yellow/Straw      Appearance Turbid (A) Clear      Specific gravity 1.012 1.003 - 1.030      pH (UA) 6.0 5.0 - 8.0      Protein Negative Negative mg/dL    Glucose Negative Negative mg/dL    Ketone 5 (A) Negative mg/dL    Bilirubin Negative Negative      Blood Negative Negative      Urobilinogen 0.1 0.1 - 1.0 EU/dL    Nitrites Negative Negative      Leukocyte Esterase Negative Negative      UA:UC IF INDICATED Culture not indicated by UA result Culture not indicated by UA result      WBC 5-10 0 - 4 /hpf    RBC 5-10 0 - 5 /hpf    Bacteria Negative Negative /hpf    Mucus Trace /lpf    PRESUMPTIVE YEAST Present     HEPATIC FUNCTION PANEL    Collection Time: 02/27/22  6:44 PM   Result Value Ref Range    Protein, total 5.8 (L) 6.4 - 8.2 g/dL    Albumin 2.4 (L) 3.5 - 5.0 g/dL    Globulin 3.4 2.0 - 4.0 g/dL    A-G Ratio 0.7 (L) 1.1 - 2.2      Bilirubin, total 0.7 0.2 - 1.0 mg/dL    Bilirubin, direct 0.3 (H) 0.0 - 0.2 mg/dL    Alk.  phosphatase 77 45 - 117 U/L    AST (SGOT) 27 15 - 37 U/L    ALT (SGPT) 22 12 - 78 U/L   AMMONIA    Collection Time: 02/27/22  6:44 PM   Result Value Ref Range    Ammonia 22 <32 umol/L   CBC W/O DIFF    Collection Time: 02/28/22  7:48 AM   Result Value Ref Range    WBC 7.8 3.6 - 11.0 K/uL    RBC 4.71 3.80 - 5.20 M/uL    HGB 12.8 11.5 - 16.0 g/dL    HCT 39.9 35.0 - 47.0 %    MCV 84.7 80.0 - 99.0 FL    MCH 27.2 26.0 - 34.0 PG    MCHC 32.1 30.0 - 36.5 g/dL    RDW 15.0 (H) 11.5 - 14.5 %    PLATELET 028 397 - 698 K/uL    NRBC 0.0 0.0  WBC    ABSOLUTE NRBC 0.00 0.00 - 9.41 K/uL   METABOLIC PANEL, BASIC    Collection Time: 02/28/22  7:48 AM   Result Value Ref Range    Sodium 138 136 - 145 mmol/L    Potassium 2.6 (LL) 3.5 - 5.1 mmol/L    Chloride 103 97 - 108 mmol/L    CO2 27 21 - 32 mmol/L    Anion gap 8 5 - 15 mmol/L    Glucose 107 (H) 65 - 100 mg/dL    BUN 3 (L) 6 - 20 mg/dL    Creatinine 0.43 (L) 0.55 - 1.02 mg/dL    BUN/Creatinine ratio 7 (L) 12 - 20      GFR est AA >60 >60 ml/min/1.73m2    GFR est non-AA >60 >60 ml/min/1.73m2    Calcium 7.8 (L) 8.5 - 10.1 mg/dL        CT HEAD WO CONT   Final Result   Age-appropriate atrophy. No acute findings. PHYSICAL EXAM:    Physical Exam  Vitals and nursing note reviewed. Constitutional:       Appearance: She is obese. She is ill-appearing. HENT:      Head: Normocephalic and atraumatic. Cardiovascular:      Rate and Rhythm: Normal rate and regular rhythm. Pulmonary:      Effort: No respiratory distress. Breath sounds: No wheezing. Abdominal:      General: Bowel sounds are normal. There is no distension. Palpations: Abdomen is soft. Tenderness: There is no abdominal tenderness. Musculoskeletal:         General: Tenderness present. Right lower leg: No edema. Comments: Trace edema on the left lateral malleoli of ankle with tenderness to palpation appears to be deviated medially   Skin:     General: Skin is warm. Capillary Refill: Capillary refill takes less than 2 seconds. Neurological:      Mental Status: She is alert.           Data Review    Recent Results (from the past 24 hour(s))   URINALYSIS W/ REFLEX CULTURE    Collection Time: 02/27/22  1:30 PM    Specimen: Urine   Result Value Ref Range    Color Yellow/Straw      Appearance Turbid (A) Clear      Specific gravity 1.012 1.003 - 1.030      pH (UA) 6.0 5.0 - 8.0      Protein Negative Negative mg/dL    Glucose Negative Negative mg/dL    Ketone 5 (A) Negative mg/dL    Bilirubin Negative Negative      Blood Negative Negative      Urobilinogen 0.1 0.1 - 1.0 EU/dL    Nitrites Negative Negative      Leukocyte Esterase Negative Negative      UA:UC IF INDICATED Culture not indicated by UA result Culture not indicated by UA result      WBC 5-10 0 - 4 /hpf    RBC 5-10 0 - 5 /hpf    Bacteria Negative Negative /hpf    Mucus Trace /lpf    PRESUMPTIVE YEAST Present     HEPATIC FUNCTION PANEL    Collection Time: 02/27/22  6:44 PM   Result Value Ref Range    Protein, total 5.8 (L) 6.4 - 8.2 g/dL    Albumin 2.4 (L) 3.5 - 5.0 g/dL    Globulin 3.4 2.0 - 4.0 g/dL    A-G Ratio 0.7 (L) 1.1 - 2.2      Bilirubin, total 0.7 0.2 - 1.0 mg/dL    Bilirubin, direct 0.3 (H) 0.0 - 0.2 mg/dL    Alk. phosphatase 77 45 - 117 U/L    AST (SGOT) 27 15 - 37 U/L    ALT (SGPT) 22 12 - 78 U/L   AMMONIA    Collection Time: 02/27/22  6:44 PM   Result Value Ref Range    Ammonia 22 <32 umol/L   CBC W/O DIFF    Collection Time: 02/28/22  7:48 AM   Result Value Ref Range    WBC 7.8 3.6 - 11.0 K/uL    RBC 4.71 3.80 - 5.20 M/uL    HGB 12.8 11.5 - 16.0 g/dL    HCT 39.9 35.0 - 47.0 %    MCV 84.7 80.0 - 99.0 FL    MCH 27.2 26.0 - 34.0 PG    MCHC 32.1 30.0 - 36.5 g/dL    RDW 15.0 (H) 11.5 - 14.5 %    PLATELET 462 418 - 349 K/uL    NRBC 0.0 0.0  WBC    ABSOLUTE NRBC 0.00 0.00 - 2.26 K/uL   METABOLIC PANEL, BASIC    Collection Time: 02/28/22  7:48 AM   Result Value Ref Range    Sodium 138 136 - 145 mmol/L    Potassium 2.6 (LL) 3.5 - 5.1 mmol/L    Chloride 103 97 - 108 mmol/L    CO2 27 21 - 32 mmol/L    Anion gap 8 5 - 15 mmol/L    Glucose 107 (H) 65 - 100 mg/dL    BUN 3 (L) 6 - 20 mg/dL    Creatinine 0.43 (L) 0.55 - 1.02 mg/dL    BUN/Creatinine ratio 7 (L) 12 - 20      GFR est AA >60 >60 ml/min/1.73m2    GFR est non-AA >60 >60 ml/min/1.73m2    Calcium 7.8 (L) 8.5 - 10.1 mg/dL        Assessment/Plan:     Active Problems:    AMS (altered mental status) (9/15/2021)      Syncope (2/25/2022)      Near syncope (2/27/2022)        Hospital Course:    Alicia Ang a 80 y. o. female with a past medical history of saddle PE and hypertension who presented on 2/25/2022 after a syncopal episode without LOC. Most of the information is gathered through daughter at bedside. Of note patient is on Eliquis 5 mg twice daily after being diagnosed with a saddle PE in 2017. In the ED vital signs showed hypotension with normal heart rate.  Laboratory data was significant for a potassium of 3.4, troponin x1 negative, and BNP 53. CT of the head showed no acute intracranial abnormality. EKG did not show ischemic changes. It was recommended admission overnight. Patient was started on IV fluids. Patient empirically started on ceftriaxone. UA collected on 2/27 which has pyuria and hematuria. Of note this was collected after started on empiric antibiotic. Syncopal episode - most likely vasovagal  CT of the head negative  ECHO: LVEF 60-65%  Cardiac telemetry  Check orthostatics  Continue with IV hydration     AMS  Hx of recurrent UTIs  Continue on ceftriaxone   UA collected on 2/27 which has pyuria and hematuria of note this was collected after started on empiric antibiotic     Hypertension   We will discontinue losartan secondary to persistent hypokalemia   Will start on metoprolol 25 mg      History of saddle PE  Continue home Eliquis      Hypokalemia - po repletion  Continue on 40mg daily    DVT Prophylaxis: eliquis  GI Prophylaxis: tolerating p.o. diet  Discharge and disposition barriers: Potassium resolution, 24-48 hours  PT/OT recommending home health    Care Plan discussed with: Patient/Family, Nurse and     Total time spent with patient: 35 minutes.

## 2022-03-01 VITALS
TEMPERATURE: 97.8 F | HEIGHT: 68 IN | OXYGEN SATURATION: 94 % | SYSTOLIC BLOOD PRESSURE: 152 MMHG | HEART RATE: 87 BPM | RESPIRATION RATE: 18 BRPM | BODY MASS INDEX: 33.34 KG/M2 | DIASTOLIC BLOOD PRESSURE: 86 MMHG | WEIGHT: 220 LBS

## 2022-03-01 LAB
ALBUMIN SERPL-MCNC: 2.3 G/DL (ref 3.5–5)
ALBUMIN/GLOB SERPL: 0.6 {RATIO} (ref 1.1–2.2)
ALP SERPL-CCNC: 69 U/L (ref 45–117)
ALT SERPL-CCNC: 22 U/L (ref 12–78)
ANION GAP SERPL CALC-SCNC: 5 MMOL/L (ref 5–15)
AST SERPL W P-5'-P-CCNC: 30 U/L (ref 15–37)
BASOPHILS # BLD: 0 K/UL (ref 0–0.1)
BASOPHILS NFR BLD: 0 % (ref 0–1)
BILIRUB SERPL-MCNC: 0.8 MG/DL (ref 0.2–1)
BUN SERPL-MCNC: 2 MG/DL (ref 6–20)
BUN/CREAT SERPL: 4 (ref 12–20)
CA-I BLD-MCNC: 8.4 MG/DL (ref 8.5–10.1)
CHLORIDE SERPL-SCNC: 105 MMOL/L (ref 97–108)
CO2 SERPL-SCNC: 26 MMOL/L (ref 21–32)
CREAT SERPL-MCNC: 0.46 MG/DL (ref 0.55–1.02)
DIFFERENTIAL METHOD BLD: ABNORMAL
EOSINOPHIL # BLD: 0.1 K/UL (ref 0–0.4)
EOSINOPHIL NFR BLD: 1 % (ref 0–7)
ERYTHROCYTE [DISTWIDTH] IN BLOOD BY AUTOMATED COUNT: 15.3 % (ref 11.5–14.5)
GLOBULIN SER CALC-MCNC: 3.8 G/DL (ref 2–4)
GLUCOSE SERPL-MCNC: 90 MG/DL (ref 65–100)
HCT VFR BLD AUTO: 42.8 % (ref 35–47)
HGB BLD-MCNC: 13.5 G/DL (ref 11.5–16)
IMM GRANULOCYTES # BLD AUTO: 0 K/UL (ref 0–0.04)
IMM GRANULOCYTES NFR BLD AUTO: 0 % (ref 0–0.5)
LYMPHOCYTES # BLD: 2.5 K/UL (ref 0.8–3.5)
LYMPHOCYTES NFR BLD: 29 % (ref 12–49)
MCH RBC QN AUTO: 27.1 PG (ref 26–34)
MCHC RBC AUTO-ENTMCNC: 31.5 G/DL (ref 30–36.5)
MCV RBC AUTO: 85.9 FL (ref 80–99)
MONOCYTES # BLD: 0.5 K/UL (ref 0–1)
MONOCYTES NFR BLD: 6 % (ref 5–13)
NEUTS SEG # BLD: 5.4 K/UL (ref 1.8–8)
NEUTS SEG NFR BLD: 64 % (ref 32–75)
NRBC # BLD: 0 K/UL (ref 0–0.01)
NRBC BLD-RTO: 0 PER 100 WBC
PLATELET # BLD AUTO: 182 K/UL (ref 150–400)
POTASSIUM SERPL-SCNC: 4 MMOL/L (ref 3.5–5.1)
PROT SERPL-MCNC: 6.1 G/DL (ref 6.4–8.2)
RBC # BLD AUTO: 4.98 M/UL (ref 3.8–5.2)
SODIUM SERPL-SCNC: 136 MMOL/L (ref 136–145)
WBC # BLD AUTO: 8.5 K/UL (ref 3.6–11)

## 2022-03-01 PROCEDURE — 74011250637 HC RX REV CODE- 250/637: Performed by: STUDENT IN AN ORGANIZED HEALTH CARE EDUCATION/TRAINING PROGRAM

## 2022-03-01 PROCEDURE — 85025 COMPLETE CBC W/AUTO DIFF WBC: CPT

## 2022-03-01 PROCEDURE — 74011250637 HC RX REV CODE- 250/637: Performed by: PHYSICIAN ASSISTANT

## 2022-03-01 PROCEDURE — 74011250636 HC RX REV CODE- 250/636: Performed by: STUDENT IN AN ORGANIZED HEALTH CARE EDUCATION/TRAINING PROGRAM

## 2022-03-01 PROCEDURE — 74011000250 HC RX REV CODE- 250: Performed by: STUDENT IN AN ORGANIZED HEALTH CARE EDUCATION/TRAINING PROGRAM

## 2022-03-01 PROCEDURE — 80053 COMPREHEN METABOLIC PANEL: CPT

## 2022-03-01 PROCEDURE — 36415 COLL VENOUS BLD VENIPUNCTURE: CPT

## 2022-03-01 RX ORDER — SULFAMETHOXAZOLE AND TRIMETHOPRIM 400; 80 MG/1; MG/1
1 TABLET ORAL 2 TIMES DAILY
Qty: 6 TABLET | Refills: 0 | Status: SHIPPED | OUTPATIENT
Start: 2022-03-01 | End: 2022-03-04

## 2022-03-01 RX ORDER — METOPROLOL SUCCINATE 25 MG/1
25 TABLET, EXTENDED RELEASE ORAL DAILY
Qty: 30 TABLET | Refills: 0 | Status: SHIPPED | OUTPATIENT
Start: 2022-03-02

## 2022-03-01 RX ADMIN — SODIUM CHLORIDE 50 ML/HR: 4.5 INJECTION, SOLUTION INTRAVENOUS at 06:28

## 2022-03-01 RX ADMIN — CEFTRIAXONE SODIUM 1 G: 1 INJECTION, POWDER, FOR SOLUTION INTRAMUSCULAR; INTRAVENOUS at 14:08

## 2022-03-01 RX ADMIN — METOPROLOL SUCCINATE 25 MG: 50 TABLET, EXTENDED RELEASE ORAL at 08:00

## 2022-03-01 RX ADMIN — POTASSIUM CHLORIDE 40 MEQ: 1500 TABLET, EXTENDED RELEASE ORAL at 08:00

## 2022-03-01 RX ADMIN — APIXABAN 5 MG: 5 TABLET, FILM COATED ORAL at 08:00

## 2022-03-01 NOTE — DISCHARGE INSTRUCTIONS
I have reviewed discharge instructions with the patient and daughter Jennymannie Streeter. The daughter Jenny Syl verbalized understanding.  D/C with ambulance

## 2022-03-01 NOTE — DISCHARGE SUMMARY
Admit date: 2/25/2022   Admitting Provider: Bigg Stephenson MD    Discharge date: 3/1/2022  Discharging Provider: MATTHEW Mcpherson      * Admission Diagnoses: Syncope [R55]  AMS (altered mental status) [R41.82]  Near syncope [R55]    * Discharge Diagnoses:    Hospital Problems as of 3/1/2022 Never Reviewed          Codes Class Noted - Resolved POA    Near syncope ICD-10-CM: R55  ICD-9-CM: 780.2  2/27/2022 - Present Unknown        Syncope ICD-10-CM: R55  ICD-9-CM: 780.2  2/25/2022 - Present Unknown        AMS (altered mental status) ICD-10-CM: R41.82  ICD-9-CM: 780.97  9/15/2021 - Present Unknown              * Hospital Course:     Dorothy Canela a 80 y. o. female with a past medical history of saddle PE and hypertension who presented on 2/25/2022 after a syncopal episode without LOC. Most of the information is gathered through daughter at bedside. Of note patient is on Eliquis 5 mg twice daily after being diagnosed with a saddle PE in 2017. In the ED vital signs showed hypotension with normal heart rate. Laboratory data was significant for a potassium of 3.4, troponin x1 negative, and BNP 53. CT of the head showed no acute intracranial abnormality. EKG did not show ischemic changes. It was recommended admission overnight. Patient was started on IV fluids. Blood culture showing no growth to date, preliminary. Patient empirically started on ceftriaxone. UA collected on 2/27 which has pyuria and hematuria. Of note this was collected after started on empiric antibiotic. Losartan was discontinued secondary to persistent hypokalemia and started on metoprolol 25 mg. X-ray of the left foot pending. Will discharge patient on Bactrim for 3 days twice a day. Patient made aware of plan and patient's daughter and they are in agreement with this.     * Procedures:   * No surgery found *      Consults:   None    Significant Diagnostic Studies: As discussed in hospital course    Discharge Exam:  Visit Vitals  BP (!) 167/97   Pulse 84   Temp 97.3 °F (36.3 °C)   Resp 18   Ht 5' 8\" (1.727 m)   Wt 99.8 kg (220 lb)   SpO2 98%   BMI 33.45 kg/m²       PHYSICAL EXAM:  Vitals and nursing note reviewed. Constitutional:       Appearance: She is obese. She is ill-appearing. HENT:      Head: Normocephalic and atraumatic. Cardiovascular:      Rate and Rhythm: Normal rate and regular rhythm. Pulmonary:      Effort: No respiratory distress. Breath sounds: No wheezing. Abdominal:      General: Bowel sounds are normal. There is no distension. Palpations: Abdomen is soft. Tenderness: There is no abdominal tenderness. Musculoskeletal:         General: Tenderness present. Right lower leg: No edema. Comments: Trace edema on the left lateral malleoli of ankle with tenderness to palpation appears to be deviated medially   Skin:     General: Skin is warm. Capillary Refill: Capillary refill takes less than 2 seconds. Neurological:      Mental Status: She is alert. * Discharge Condition: improved  * Disposition: Home    Discharge Medications:  Current Discharge Medication List      START taking these medications    Details   metoprolol succinate (TOPROL-XL) 25 mg XL tablet Take 1 Tablet by mouth daily. Qty: 30 Tablet, Refills: 0  Start date: 3/2/2022      trimethoprim-sulfamethoxazole (BACTRIM, SEPTRA)  mg per tablet Take 1 Tablet by mouth two (2) times a day for 3 days. Qty: 6 Tablet, Refills: 0  Start date: 3/1/2022, End date: 3/4/2022         CONTINUE these medications which have NOT CHANGED    Details   rivastigmine (EXELON) 4.6 mg/24 hour patch 1 Patch by TransDERmal route daily. apixaban (Eliquis) 5 mg tablet Take 5 mg by mouth two (2) times a day. STOP taking these medications       losartan (COZAAR) 50 mg tablet Comments:   Reason for Stopping:               * Follow-up Care/Patient Instructions:   Activity: PT/OT per Home Health  Diet: Cardiac Diet  Wound Care: None needed    Follow-up Information     Follow up With Specialties Details Why Naomi Olmos MD Family Medicine Schedule an appointment as soon as possible for a visit in 2 weeks routine follow-up [address]  [phone]            Discharge summary greater than 35 minutes spent with the patient performing discharge instructions, medication review and physical exam    Signed:  MATTHEW Epps  3/1/2022  10:01 AM

## 2022-03-01 NOTE — PROGRESS NOTES
DC Plan: Home Health       Transportation 2pm    CM met with pt and daughter David Alba at the bedside. PT/OT is recommending home health. Cm discussed and educated home heatlh. Daughter would like some time to review  choice list. Cm notified daughter of discharge today. Daughter indicated she thought pt was going to be at the hospital for one more day. Daughter had some questions for the attending. Cm informed daughter Cm will have the attending follow up with her. Cm presented and discussed IMM. Daughter would like to know if stretcher transportation could be arranged before she signs the IMM. Cm informed daughter CM will f/up with her. Cm spoke with attending. Will Martin was contacted. Cm met with pt and daughter at the bedside and updated daughter on transportation. Daughter is okay with discharge home today. Daughter indicated she would like a day to review [facility] choice list. Daughter plans to research the home health agencies via Internet. Daughter is agreeable with CM following up with her tomorrow for her decision. Medicare pt has received, reviewed, and signed 2nd IM letter informing them of their right to appeal the discharge. Signed copied has been placed on pt bedside chart. Cm updated pt's nurse. Discharge plan of care/case management plan validated with provider's discharge order.

## 2022-03-06 LAB
BACTERIA SPEC CULT: NORMAL
SPECIAL REQUESTS,SREQ: NORMAL

## 2022-03-18 PROBLEM — E44.1 MILD PROTEIN-CALORIE MALNUTRITION (HCC): Status: ACTIVE | Noted: 2021-09-16

## 2022-03-18 PROBLEM — R55 SYNCOPE: Status: ACTIVE | Noted: 2022-02-25

## 2022-03-19 PROBLEM — R55 NEAR SYNCOPE: Status: ACTIVE | Noted: 2022-02-27

## 2022-03-20 PROBLEM — R41.82 AMS (ALTERED MENTAL STATUS): Status: ACTIVE | Noted: 2021-09-15

## 2023-05-25 RX ORDER — RIVASTIGMINE 4.6 MG/24H
1 PATCH, EXTENDED RELEASE TRANSDERMAL DAILY
COMMUNITY
Start: 2022-02-06

## 2023-05-25 RX ORDER — METOPROLOL SUCCINATE 25 MG/1
25 TABLET, EXTENDED RELEASE ORAL DAILY
COMMUNITY
Start: 2022-03-02